# Patient Record
Sex: MALE | Race: WHITE
[De-identification: names, ages, dates, MRNs, and addresses within clinical notes are randomized per-mention and may not be internally consistent; named-entity substitution may affect disease eponyms.]

---

## 2017-10-10 ENCOUNTER — HOSPITAL ENCOUNTER (OUTPATIENT)
Dept: HOSPITAL 80 - FSGY | Age: 72
Discharge: HOME | End: 2017-10-10
Attending: ORTHOPAEDIC SURGERY
Payer: COMMERCIAL

## 2017-10-10 VITALS
SYSTOLIC BLOOD PRESSURE: 127 MMHG | OXYGEN SATURATION: 90 % | RESPIRATION RATE: 15 BRPM | DIASTOLIC BLOOD PRESSURE: 74 MMHG

## 2017-10-10 VITALS — TEMPERATURE: 96.8 F | HEART RATE: 53 BPM

## 2017-10-10 DIAGNOSIS — M19.011: ICD-10-CM

## 2017-10-10 DIAGNOSIS — S46.191A: Primary | ICD-10-CM

## 2017-10-10 DIAGNOSIS — S43.421A: ICD-10-CM

## 2017-10-10 LAB
ANION GAP SERPL CALC-SCNC: 12 MEQ/L (ref 8–16)
CALCIUM SERPL-MCNC: 10 MG/DL (ref 8.5–10.4)
CHLORIDE SERPL-SCNC: 108 MEQ/L (ref 97–110)
CO2 SERPL-SCNC: 17 MEQ/L (ref 22–31)
CREAT SERPL-MCNC: 1.4 MG/DL (ref 0.7–1.3)
GFR SERPL CREATININE-BSD FRML MDRD: 50 ML/MIN/{1.73_M2}
GLUCOSE SERPL-MCNC: 212 MG/DL (ref 70–100)
INR PPP: 1.13 (ref 0.83–1.16)
POTASSIUM SERPL-SCNC: 5 MEQ/L (ref 3.5–5.2)
PROTHROMBIN TIME: 14.4 SEC (ref 12–15)
SODIUM SERPL-SCNC: 137 MEQ/L (ref 134–144)

## 2017-10-10 PROCEDURE — 0LS14ZZ REPOSITION RIGHT SHOULDER TENDON, PERCUTANEOUS ENDOSCOPIC APPROACH: ICD-10-PCS | Performed by: ORTHOPAEDIC SURGERY

## 2017-10-10 PROCEDURE — 0MN14ZZ RELEASE RIGHT SHOULDER BURSA AND LIGAMENT, PERCUTANEOUS ENDOSCOPIC APPROACH: ICD-10-PCS | Performed by: ORTHOPAEDIC SURGERY

## 2017-10-10 PROCEDURE — 0MB14ZZ EXCISION OF RIGHT SHOULDER BURSA AND LIGAMENT, PERCUTANEOUS ENDOSCOPIC APPROACH: ICD-10-PCS | Performed by: ORTHOPAEDIC SURGERY

## 2017-10-10 PROCEDURE — 0PB94ZZ EXCISION OF RIGHT CLAVICLE, PERCUTANEOUS ENDOSCOPIC APPROACH: ICD-10-PCS | Performed by: ORTHOPAEDIC SURGERY

## 2017-10-10 RX ADMIN — Medication PRN MCG: at 10:15

## 2017-10-10 RX ADMIN — Medication PRN MCG: at 10:39

## 2017-10-10 NOTE — PDGENHP
History & Physical


Chief Complaint: shoulder pain


History of Present Illness: 73 yo


Pertinent Past, Social, Family History: none


Relevant Physical Exam: pain

## 2017-10-10 NOTE — PDANEPAE
ANE History of Present Illness





73 yo male with multi co-morbidities for R shoulder arthroscopy.





ANE Past Medical History





- Cardiovascular History


Hx Hypertension: Yes


Hx Arrhythmias: Yes


Hx Chest Pain: No


Hx Coronary Artery / Peripheral Vascular Disease: No


Hx CHF / Valvular Disease: No


Hx Palpitations: No


Cardiovascular History Comment: cardioversion for flutter 12-16  takes 

flecainide.Converted to NSR 12-16.





- Pulmonary History


Hx COPD: No


Hx Asthma/Reactive Airway Disease: No


Hx Recent Upper Respiratory Infection: No


Hx Oxygen in Use at Home: Yes


O2 in Use at Home (L/minute): 5L for sleeping;5L 14/wk-while working


Hx Sleep Apnea: Yes


Sleep Apnea Screening Result - Last Documented: Positive


Pulmonary History Comment: sleep apnea-bipap.  chronic low o2 sats usually mid 

80's





- Neurologic History


Hx Cerebrovascular Accident: No


Hx Seizures: No


Hx Dementia: No





- Endocrine History


Hx Diabetes: Yes


Obesity: moderate


Endocrine History Comment: type 2





- Renal History


Hx Renal Disorders: Yes


Renal History Comment: hx kidney stones- passed.





- Liver History


Hx Hepatic Disorders: Yes


Hepatic History Comment: hx hep 40 yrs ago- ostrolus r/t hygiene in [place]. 

jaundice while living in [place].





- Neurological & Psychiatric Hx


Hx Neurological and Psychiatric Disorders: Yes


Neurological / Psychiatric History Comment: mild depression- comes and goes





- Cancer History


Hx Cancer: No





- Congenital Disorder History


Hx Congenital Disorders: No





- GI History


Hx Gastrointestinal Disorders: No





- Other Health History


Other Health History: gout.  osteoarthritis.  bursitis.  tendonitis-3 tendons 

torn R shoulder.  shin splints.  





- Chronic Pain History


Chronic Pain: Yes (all joints)





- Surgical History


Prior Surgeries: L total hip 2014.  2012- l shoulder arthroscopy subacromial 

decompression, rot cuff repair,labral debride.  l thumb reconstruction. 2004-

tooth ext.





ANE Review of Systems


Review of Systems: 








- Exercise capacity


METS (RN): 4 METS





- Systems


Constitutional: Reports: no symptoms


EENMT: Reports: other (clear rhinorrhea x3 days last week, now resolved, no 

other symptoms)


Cardiac: Reports: no symptoms


Respiratory: Reports: shortness of breath (chronic, stable; worse with activity)





ANE Patient History





- Allergies


Allergies/Adverse Reactions: 








levofloxacin [From Levaquin] Allergy (Verified 10/05/17 16:08)


 Other-Enter Comments








- Home Medications


Home medications: home medication list seen and reviewed


Home Medications: 








Flecainide Acetate [Tambocor] 100 mg PO BIDMEAL 06/19/12 [Last Taken 12/25/16 08

:00]


Metoprolol Tartrate [Lopressor 100 mg (*)] 100 mg PO DAILY@08 06/19/12 [Last 

Taken 12/25/16 08:00]


metFORMIN HCL [Glucophage 500 mg (*)] 500 mg PO BIDMEAL 06/19/12 [Last Taken 12/ 25/16 08:00]


Aspirin EC [Aspirin EC 81 mg (*)] 81 mg PO DAILY 03/10/14 [Last Taken 10/04/17 

09:30]


Gemfibrozil [Lopid 600 MG (*)] 600 mg PO BIDMEAL 03/10/14 [Last Taken 12/25/16 

08:00]


Pravastatin Sodium 20 mg PO HS 03/10/14 [Last Taken 12/24/16]


Insulin Lispro [Humalog Kwikpen U-100] 35 units SC BIDAC 05/04/16 [Last Taken 12 /25/16 08:00]


Omega-3 Fatty Acids [Fish Oil 1000 mg (*)] 1,000 mg PO BIDMEAL 05/04/16 [Last 

Taken 10/05/17 09:30]


Acetaminophen [Tylenol 325mg (*)] 650 mg PO Q6 PRN 09/07/16 [Last Taken 12/21/16

]


Losartan Potassium [Cozaar] 50 mg PO DAILY 09/07/16 [Last Taken 12/25/16]


Allopurinol [Allopurinol 100 MG (*)] 100 mg PO BID 12/25/16 [Last Taken 12/25/ 16 08:00]


Dulaglutide [Trulicity] 1.5 mg SC MO@09 12/25/16 [Last Taken 12/19/16]


Herbals/Supplements -Info Only 1 ea PO DAILY 12/25/16 [Last Taken 10/05/17 09:30

]


Metoprolol Tartrate [Lopressor 100 mg (*)] 200 mg PO DAILY@18 12/25/16 [Last 

Taken 12/24/16]


ERYTHROMYCIN 2% GEL  10/05/17 [Last Taken Unknown]


Lantus 100 UNITS/ML (*)  10/05/17 [Last Taken Unknown]


Warfarin Sodium  10/05/17 [Last Taken 10/04/17 19:00]








- NPO status


NPO Since - Liquids (Date): 10/10/17


NPO Since - Liquids (Time): 02:30


NPO Since - Solids (Date): 10/09/17





- Anes Hx


Anes Hx: slow to awaken from anesthesia





- Smoking Hx


Smoking Status: Never smoked


Marijuana use: No





- Alcohol Use


Alcohol Use: Rarely





- Family Anes Hx


Family Anes Hx: neg - N/A


Family Hx Anesthesia Complications: none





ANE Labs/Vital Signs





- Labs


Result Diagrams: 


 10/10/17 07:00





- Vital Signs


Vital Signs: reviewed preoperatively; see RN documention for details


Height: 190.5 cm


Weight: 131.542 kg





ANE Physical Exam





- Airway


Neck exam: FROM


Mallampati Score: Class 2


Mouth exam: normal dental/mouth exam





- Pulmonary


Pulmonary: other (coarse breath sounds throughout)





- Cardiovascular


Cardiovascular: regular rate and rhythym





- ASA Status


ASA Status: III





ANE Anesthesia Plan


Anesthesia Plan: general endotracheal anesthesia

## 2017-10-10 NOTE — POSTANESTH
Post Anesthetic Evaluation


Cardiovascular Status: Normal, Stable


Respiratory Status: Tx Decrease in SpO2


Level of Consciousness/Mental Status: Can Participate in Eval, Mildly Sleepy, 

Arousable


Pain Control: Adequate, Prn Tx Ordered


Nausea/Vomiting Control: Adequate, Prn Tx Ordered


Complications Possibly Related to Anesthesia: None Noted (Pt on home CPAP 

settings with O2 bleed in to maintain SpO2 >90%)

## 2017-10-26 NOTE — GOP
[f rep st]



                                                                OPERATIVE REPORT





DATE OF OPERATION:  10/10/2017



SURGEON:  Gaby Kenyon MD



ANESTHESIA:  General.



PREOPERATIVE DIAGNOSIS:  Right shoulder impingement syndrome with associated labral pathology, glenoh
umeral arthritic changes, acromioclavicular arthropathy.  Rule out biceps tendinitis.



POSTOPERATIVE DIAGNOSIS:  Right shoulder impingement syndrome with associated labral pathology, gleno
humeral arthritic changes, acromioclavicular arthropathy.  Rule out biceps tendinitis.  With signific
antly fraying of the long head of the biceps involving the intra-articular portion.



PROCEDURE PERFORMED:  

1.  Arthroscopic debridement of partial-thickness rotator cuff tear, labral fraying, and glenohumeral
 chondral changes.

2.  Arthroscopic subacromial decompression.

3.  Biceps tenotomy. 

4.  Ngozi resection of distal clavicle.



FINDINGS:  





ESTIMATED BLOOD LOSS:  5 cc.



INDICATIONS:  This patient is a 72-year-old right-hand dominant male, who has had right shoulder pain
, which has been unresponsive to conservative management.  He has been evaluated with an MRI scan, wh
ich shows evidence of the above-noted preoperative diagnoses.  He was admitted for operative treatmen
t.



DESCRIPTION OF PROCEDURE:  After the induction of a general anesthetic, the patient was placed on the
 operating room table in a modified beach chair position.  His right shoulder, axilla, and arm were p
repped and draped in the usual fashion.  Standard arthroscopic portals were utilized.  The scope was 
introduced through the posterior portal, and the glenohumeral joint inspected.  The intra-articular e
xam was remarkable for a constellation of abnormal findings.  There was a partial-thickness tear invo
lving the supraspinatus at its insertion on the greater tuberosity.  This was minimally debrided.  It
 appeared to involve much less than 50% of the depth of the tendon in a segment that measured approxi
mately a centimeter and a half in length.  It was felt to be a lesion that would likely heal followin
g decompression.  



The glenoid and humeral articular surfaces were in relatively good condition.  There was some focal c
hondral damage on both the humeral and glenoid sides, which was minimally debrided.  There were no ar
eas of full-thickness loss.  



The biceps tendon was inspected.  There was sign fraying, as the long head of the biceps exited the s
houlder.  The damage appeared to be greater than what would likely heal following debridement and dec
ompression.  A biceps tenotomy was therefore performed.  



There was some fraying of the anterior and superior labrum, which was minimally debrided with the rot
sergo debrider.  The glenohumeral ligament/labral complex appeared to be competent.  



The scope was passed to the subacromial space.  There was marked chaffing of the CA ligament at its i
nsertion on the anterior acromion. 



The undersurface of the acromion was resected, and the CA ligament released.  The markedly-prominent 
anterior lip to the acromion was removed with the rotary juliet.  Approximately a centimeter of anterio
r bone was resected.  The resection was tapered posteriorly till it blended nicely with the subacromi
al surface.  This nicely decompressed the subacromial space. 



The AC joint was inspected.  There was diffuse grade 3 with focal grade 4 arthritic change on the dis
rosalinda clavicle.  There was also a large spur emanating from the inferior surface of the distal clavicle
, encroaching on the acromial outlet.  A distal clavicular excision was, therefore, performed.  Appro
ximately the distal centimeter of the clavicle was removed.  



At this point, the debrider was evacuated from the shoulder and subacromial space before the arthrosc
opic equipment was removed.  The portals were infiltrated with 0.25% Marcaine solution before being c
losed with 5-0 Vicryl.  Steri-Strips and sterile compressive dressing were applied, and the patient w
as awakened from his anesthetic without complication.



COMPLICATIONS:  None.





Job #:  [id number]

## 2018-04-19 ENCOUNTER — HOSPITAL ENCOUNTER (INPATIENT)
Dept: HOSPITAL 80 - FED | Age: 73
LOS: 1 days | Discharge: HOME | DRG: 149 | End: 2018-04-20
Attending: FAMILY MEDICINE | Admitting: FAMILY MEDICINE
Payer: COMMERCIAL

## 2018-04-19 DIAGNOSIS — E86.0: ICD-10-CM

## 2018-04-19 DIAGNOSIS — R00.1: ICD-10-CM

## 2018-04-19 DIAGNOSIS — E11.22: ICD-10-CM

## 2018-04-19 DIAGNOSIS — Z99.81: ICD-10-CM

## 2018-04-19 DIAGNOSIS — R42: Primary | ICD-10-CM

## 2018-04-19 DIAGNOSIS — E66.9: ICD-10-CM

## 2018-04-19 DIAGNOSIS — N18.3: ICD-10-CM

## 2018-04-19 DIAGNOSIS — Z79.4: ICD-10-CM

## 2018-04-19 DIAGNOSIS — I12.9: ICD-10-CM

## 2018-04-19 DIAGNOSIS — E11.65: ICD-10-CM

## 2018-04-19 DIAGNOSIS — J96.11: ICD-10-CM

## 2018-04-19 DIAGNOSIS — E87.5: ICD-10-CM

## 2018-04-19 DIAGNOSIS — Z66: ICD-10-CM

## 2018-04-19 LAB
INR PPP: 1.62 (ref 0.83–1.16)
PLATELET # BLD: 188 10^3/UL (ref 150–400)
PROTHROMBIN TIME: 19.4 SEC (ref 12–15)

## 2018-04-19 RX ADMIN — FLECAINIDE ACETATE SCH MG: 100 TABLET ORAL at 23:40

## 2018-04-19 RX ADMIN — INSULIN GLARGINE SCH UNITS: 100 INJECTION, SOLUTION SUBCUTANEOUS at 22:17

## 2018-04-19 RX ADMIN — Medication SCH MG: at 22:17

## 2018-04-19 NOTE — PDGENHP
History and Physical





- Chief Complaint


dizzy, weakness





- History of Present Illness


 72 y/o male with a history of atrial fibrillation and type 2 diabetes 

complaining of intermittent episodes feeling dizzy and lightheaded that last 

for several hours, onset 3 days ago. His physican recommended that he present 

to the ED today as the dizziness has not improved. He is currently not feeling 

dizzy. Over the past few weeks he has been having ELLISON and has noticed leg 

swelling. He uses 4 L O2 while working and while sleeping. He has not had an 

increased to this. He does not use any at rest. He has not had any chest pain. 

He has not felt palpitations. He denies a hx of CHF





He has a hx of Afib which he says he is typically not in. Here he was noted to 

have sinus bradycardia in the mid 40's. He was also noted to have hyperkalemia 

and this was treated in the E.D. 





No fever, chills, chest pain, shortness of breath, palpitations, vomiting, 

diarrhea, urinary complaints, headache.








PAST MEDICAL HISTORY: Atrial fibrillation, type 2 diabetes, low O2 sats (seen 

at [facility]), hepatitis A, multiple ortho surgeries, wears 4L 

supplemental oxygen with exertion.  





SOCIAL HISTORY:  lives in [place], no tobacco





Fmx: NC





Lab/Data:


Reviewed





History Information





- Allergies/Home Medication List


Allergies/Adverse Reactions: 








levofloxacin [From Levaquin] Allergy (Verified 04/19/18 15:32)


 Other-Enter Comments





Home Medications: 








Flecainide Acetate [Tambocor] 100 mg PO BIDMEAL 06/19/12 [Last Taken 04/19/18]


metFORMIN HCL [Glucophage 500 mg (*)] 500 mg PO BIDMEAL 06/19/12 [Last Taken 04/ 19/18]


Aspirin EC [Aspirin EC 81 mg (*)] 81 mg PO DAILY 03/10/14 [Last Taken 04/19/18]


Pravastatin Sodium 20 mg PO HS 03/10/14 [Last Taken 04/18/18]


Omega-3 Fatty Acids [Fish Oil 1000 mg (*)] 1,000 mg PO TID 05/04/16 [Last Taken 

04/19/18]


Losartan Potassium [Cozaar] 50 mg PO DAILY 09/07/16 [Last Taken 04/19/18]


Allopurinol [Allopurinol 100 MG (*)] 100 mg PO BID 12/25/16 [Last Taken 04/19/18

]


Herbals/Supplements -Info Only 1 ea PO DAILY 12/25/16 [Last Taken 10/05/17 09:30

]


Insulin Detemir [Levemir] 40 unit SQ BID 04/19/18 [Last Taken 04/19/18]


Insulin Lispro [Humalog] 25 unit SQ DAILY@12 04/19/18 [Last Taken 04/19/18]


Insulin Lispro [Humalog] 40 unit SQ BID 04/19/18 [Last Taken 04/19/18]


Warfarin Sodium [Coumadin 5MG (*)] 5 mg PO MOTUWETHFRSA@16 04/19/18 [Last Taken 

04/18/18]


Warfarin Sodium [Coumadin 5MG (*)] 5 mg PO TOVAR@16 04/19/18 [Last Taken 04/15/18]





I have personally reviewed and updated: medical history, social history





- Social History


Smoking Status: Never smoked





Review of Systems


Review of Systems: 





ROS: 10pt was reviewed & negative except for what was stated in HPI & below





Physical Exam


Physical Exam: 

















Temp Pulse Resp BP Pulse Ox


 


 36.6 C   48 L  16   187/78 H  90 L


 


 04/19/18 20:01  04/19/18 20:01  04/19/18 20:01  04/19/18 20:01  04/19/18 20:01




















O2 (L/minute)                  2














Constitutional: no apparent distress


Eyes: PERRL, EOMI


Ears, Nose, Mouth, Throat: moist mucous membranes, hearing normal


Cardiovascular: regular rate and rhythym, edema (trace LE edema bilaterally), 

No JVD


Respiratory: other (decreased at bases)


Gastrointestinal: normoactive bowel sounds, soft, non-tender abdomen


Skin: warm


Musculoskeletal: full muscle strength


Neurologic: AAOx3


Psychiatric: interacting appropriately, not anxious, not encephalopathic





Lab Data & Imaging Review





 04/19/18 16:20





 04/19/18 16:20














WBC  6.67 10^3/uL (3.80-9.50)   04/19/18  16:20    


 


RBC  5.16 10^6/uL (4.40-6.38)   04/19/18  16:20    


 


Hgb  16.0 g/dL (13.7-17.5)   04/19/18  16:20    


 


Hct  47.6 % (40.0-51.0)   04/19/18  16:20    


 


MCV  92.2 fL (81.5-99.8)   04/19/18  16:20    


 


MCH  31.0 pg (27.9-34.1)   04/19/18  16:20    


 


MCHC  33.6 g/dL (32.4-36.7)   04/19/18  16:20    


 


RDW  15.3 % (11.5-15.2)  H  04/19/18  16:20    


 


Plt Count  188 10^3/uL (150-400)   04/19/18  16:20    


 


MPV  10.8 fL (8.7-11.7)   04/19/18  16:20    


 


Neut % (Auto)  51.6 % (39.3-74.2)   04/19/18  16:20    


 


Lymph % (Auto)  34.8 % (15.0-45.0)   04/19/18  16:20    


 


Mono % (Auto)  9.3 % (4.5-13.0)   04/19/18  16:20    


 


Eos % (Auto)  3.3 % (0.6-7.6)   04/19/18  16:20    


 


Baso % (Auto)  0.6 % (0.3-1.7)   04/19/18  16:20    


 


Nucleat RBC Rel Count  0.0 % (0.0-0.2)   04/19/18  16:20    


 


Absolute Neuts (auto)  3.44 10^3/uL (1.70-6.50)   04/19/18  16:20    


 


Absolute Lymphs (auto)  2.32 10^3/uL (1.00-3.00)   04/19/18  16:20    


 


Absolute Monos (auto)  0.62 10^3/uL (0.30-0.80)   04/19/18  16:20    


 


Absolute Eos (auto)  0.22 10^3/uL (0.03-0.40)   04/19/18  16:20    


 


Absolute Basos (auto)  0.04 10^3/uL (0.02-0.10)   04/19/18  16:20    


 


Absolute Nucleated RBC  0.00 10^3/uL (0-0.01)   04/19/18  16:20    


 


Immature Gran %  0.4 % (0.0-1.1)   04/19/18  16:20    


 


Immature Gran #  0.03 10^3/uL (0.00-0.10)   04/19/18  16:20    


 


Sodium  139 mEq/L (135-145)   04/19/18  16:20    


 


Potassium  6.1 mEq/L (3.5-5.2)  H  04/19/18  16:20    


 


Chloride  109 mEq/L ()   04/19/18  16:20    


 


Carbon Dioxide  19 mEq/l (22-31)  L  04/19/18  16:20    


 


Anion Gap  11 mEq/L (8-16)   04/19/18  16:20    


 


BUN  42 mg/dL (7-23)  H  04/19/18  16:20    


 


Creatinine  1.5 mg/dL (0.7-1.3)  H  04/19/18  16:20    


 


Estimated GFR  46   04/19/18  16:20    


 


Glucose  223 mg/dL ()  H  04/19/18  16:20    


 


Calcium  8.6 mg/dL (8.5-10.4)   04/19/18  16:20    


 


Troponin I  < 0.012 ng/mL (0.000-0.034)   04/19/18  16:20    


 


Specimen Hemolysis  101   04/19/18  16:20    











Assessment & Plan


Assessment: 








#Acute Hyperkalemia


-No EKG changes


-s/p treatment in the E.D.


-Has not received diuretics





#Reina Syncope, Dizziness, and generalized weakness





#sinus bradycardia with a hx of Afib





#Acute vs chronic renal failure


-Does not appear to be intravascularly dry





#IDDM





#HTN


-takes Losartan at home





#chronic AC with Warfarin


-INR not checked





#chronic resp failure with ELLISON





Plan:


Admit


check TTE, serial trops, telemetry


Unclear if Cards was consulted in the E.D. He sees Dr. Mack in the E.D.


He has pedal edema and decreased lung sounds at bases. Given these findings and 

Hyperkalemia, I will provide small amount of Lasix now. Unfortunately a CXR was 

not obtained, and Ill check one now


Hold Losartan and it could have contributed to the Hyperkalemia. Can treat HTN 

with IV Hydralazine overnight


Cont Flecainide for now. This has reported less than 1 % incidence of causing 

bradycardia. Cards to eval


Home insulin. Holding Metformin


Coumadin per pharmacy. INR was not checked, ill order now


Monitor cr closely. This may be chronic but BUN elevation noted. Will obtain 

urine studies.








DNR, confirmed at bedside

## 2018-04-19 NOTE — EDPHY
HPI/HX/ROS/PE/MDM


Narrative: 


CHIEF COMPLAINT:  Dizzy, lightheaded





HISTORY OF PRESENT ILLNESS:


The patient is an anticoagulated (Coumadin) 72 y/o male with a history of 

atrial fibrillation and type 2 diabetes complaining of intermittent episodes 

feeling dizzy and lightheaded that last for several hours, onset 3 days ago. 

His physician recommended that he present to the ED today as the dizziness has 

not improved. He is currently not feeling dizzy. Denies taking medications for 

CHF.  Patient is on flecainide for arrhythmias





No fever, chills, chest pain, shortness of breath, palpitations, vomiting, 

diarrhea, urinary complaints, headache.





REVIEW OF SYSTEMS:


Aside from elements discussed in the HPI, a comprehensive 10-point review of 

systems was reviewed and is negative.





PAST MEDICAL HISTORY: Atrial fibrillation, type 2 diabetes, low O2 sats (seen 

at [facility]) with O2 dependency,, hepatitis A, multiple ortho surgeries, 

wears 4L supplemental oxygen with exertion.  





SOCIAL HISTORY: Friend at bedside, employed, lives in [place]





VITAL SIGNS: Reviewed by me


GENERAL:  Obese gentleman, resting comfortably in no respiratory distress.


HEENT: Atraumatic. Eyes: No icterus, no injection. Mouth: moist mucous 

membranes.  No erythema or lesions. Neck: supple with no adenopathy.


LUNGS: Clear to auscultation bilaterally, no wheezes, rhonchi or rales.


CARDIAC: Regular bradycardia with a rate of 40, no rubs, murmurs or gallops.


ABDOMEN: Soft, nontender, nondistended, bowel sounds normal.


BACK:  No CVA tenderness.


EXTREMITIES: No trauma. No edema.  Range of motion is normal throughout.


NEURO: Alert and oriented,  grossly nonfocal.  


SKIN: Warm and dry, no rash.


PSYCHIATRIC: Normal mentation, no agitation.





Portions of this note were transcribed by a medical scribe.  I personally 

performed a history, physical exam, medical decision making, and confirmed 

accuracy of information the transcribed note.





ED Course: 


The patient is an anticoagulated (Coumadin) 72 y/o male with a history of 

atrial fibrillation and type 2 diabetes presenting with intermittent episodes 

of feeling dizzy and lightheaded that last for several hours, onset 3 days ago. 

Labs and EKG ordered.





1614: 12-LEAD EKG:  Please see the full report in [facility]. My interpretation

: Sinus bradycardia with a rate of 46, first degree AV block





1706: Patient has a potassium of 6.1 and a creatinine of 1.5.  Given his 

dizziness in the setting of bradycardia as well as worsening renal function 

with a potassium of 6.1, patient received treatment for hyperkalemia including 

calcium, bicarbonate, D50, insulin.





1715: Consulted with hospitalist service, Dr. Concepcion accepts admission of 

this patient.





Reassessed patient and discussed laboratory and EKG findings. I have also 

discussed plan for admission, which he is comfortable with.





MDM: 





Differential diagnosis of the patient's dizziness was considered including but 

not limited to peripheral and central causes of vertigo, cardiac arrhythmias, 

cardiac ischemia, electrolyte disturbances, neurologic causes, orthostatic 

causes including dehydration, and blood loss.





- Data Points


Imaging Results: 


 Imaging Impressions





Carotid Doppler Study  04/19/18 06:00


Impression:  No evidence of flow-limiting carotid stenosis.


 


Measurement of carotid stenosis is based on velocity parameters that correlate 

the residual internal carotid diameter with [place] Symptomatic Carotid 

Endarterectomy Trial (NASCET) based stenosis levels.


 


 


 











Laboratory Results: 


 Laboratory Results





 04/19/18 16:20 





 04/19/18 16:20 





 











  04/19/18 04/19/18





  16:20 16:20


 


WBC    6.67 10^3/uL 10^3/uL





    (3.80-9.50) 


 


RBC    5.16 10^6/uL 10^6/uL





    (4.40-6.38) 


 


Hgb    16.0 g/dL g/dL





    (13.7-17.5) 


 


Hct    47.6 % %





    (40.0-51.0) 


 


MCV    92.2 fL fL





    (81.5-99.8) 


 


MCH    31.0 pg pg





    (27.9-34.1) 


 


MCHC    33.6 g/dL g/dL





    (32.4-36.7) 


 


RDW    15.3 % H %





    (11.5-15.2) 


 


Plt Count    188 10^3/uL 10^3/uL





    (150-400) 


 


MPV    10.8 fL fL





    (8.7-11.7) 


 


Neut % (Auto)    51.6 % %





    (39.3-74.2) 


 


Lymph % (Auto)    34.8 % %





    (15.0-45.0) 


 


Mono % (Auto)    9.3 % %





    (4.5-13.0) 


 


Eos % (Auto)    3.3 % %





    (0.6-7.6) 


 


Baso % (Auto)    0.6 % %





    (0.3-1.7) 


 


Nucleat RBC Rel Count    0.0 % %





    (0.0-0.2) 


 


Absolute Neuts (auto)    3.44 10^3/uL 10^3/uL





    (1.70-6.50) 


 


Absolute Lymphs (auto)    2.32 10^3/uL 10^3/uL





    (1.00-3.00) 


 


Absolute Monos (auto)    0.62 10^3/uL 10^3/uL





    (0.30-0.80) 


 


Absolute Eos (auto)    0.22 10^3/uL 10^3/uL





    (0.03-0.40) 


 


Absolute Basos (auto)    0.04 10^3/uL 10^3/uL





    (0.02-0.10) 


 


Absolute Nucleated RBC    0.00 10^3/uL 10^3/uL





    (0-0.01) 


 


Immature Gran %    0.4 % %





    (0.0-1.1) 


 


Immature Gran #    0.03 10^3/uL 10^3/uL





    (0.00-0.10) 


 


Sodium  139 mEq/L mEq/L  





   (135-145)  


 


Potassium  6.1 mEq/L H mEq/L  





   (3.5-5.2)  


 


Chloride  109 mEq/L mEq/L  





   ()  


 


Carbon Dioxide  19 mEq/l L mEq/l  





   (22-31)  


 


Anion Gap  11 mEq/L mEq/L  





   (8-16)  


 


BUN  42 mg/dL H mg/dL  





   (7-23)  


 


Creatinine  1.5 mg/dL H mg/dL  





   (0.7-1.3)  


 


Estimated GFR  46   





   


 


Glucose  223 mg/dL H mg/dL  





   ()  


 


Calcium  8.6 mg/dL mg/dL  





   (8.5-10.4)  


 


Troponin I  < 0.012 ng/mL ng/mL  





   (0.000-0.034)  


 


Specimen Hemolysis  101   





   











Medications Given: 


 





Insulin Glargine (Lantus Syringe)  40 units SC BID ADONIS


   Stop: 10/16/18 20:59


   Last Admin: 04/19/18 22:17 Dose:  40 units


Omega-3-Acid Ethyl Esters (Fish Oil)  1,000 mg PO TID ADONIS


   Stop: 10/16/18 21:59


   Last Admin: 04/19/18 22:17 Dose:  1,000 mg


Pravastatin Sodium (Pravachol)  20 mg PO HS ADONIS


   Stop: 10/16/18 20:59


   Last Admin: 04/19/18 22:17 Dose:  20 mg





Discontinued Medications





Calcium Gluconate (Calcium Gluconate)  1 gm IVP EDNOW ONE


   Stop: 04/19/18 17:05


   Last Admin: 04/19/18 17:14 Dose:  1 gm


Dextrose (Dextrose 50% Syringe)  25 gm IVP EDNOW ONE


   Stop: 04/19/18 17:05


   Last Admin: 04/19/18 17:15 Dose:  25 gm


Furosemide (Lasix Injection)  20 mg IVP ONCE ONE


   Stop: 04/19/18 21:12


   Last Admin: 04/19/18 22:17 Dose:  20 mg


Sodium Bicarbonate 150 meq/ (Dextrose)  1,150 mls @ 0 mls/hr IV EDNOW ONE


   PRN Reason: As Directed


   Stop: 04/19/18 17:05


   Last Admin: 04/19/18 17:39 Dose:  1,150 mls


Insulin Human Regular (Humulin R)  10 unit IVP EDNOW ONE


   Stop: 04/19/18 17:05


   Last Admin: 04/19/18 17:20 Dose:  10 units


Sodium Polystyrene Sulfonate (Kayexalate)  30 gm PO EDNOW ONE


   Stop: 04/19/18 17:05


   Last Admin: 04/19/18 17:23 Dose:  30 gm








General


Time Seen by Provider: 04/19/18 16:14


Initial Vital Signs: 


 Initial Vital Signs











Temperature (C)  36.7 C   04/19/18 15:34


 


Heart Rate  49 L  04/19/18 15:34


 


Respiratory Rate  18   04/19/18 15:34


 


Blood Pressure  161/81 H  04/19/18 15:34


 


O2 Sat (%)  92   04/19/18 15:34








 











O2 Delivery Mode               Room Air














Allergies/Adverse Reactions: 


 





levofloxacin [From Levaquin] Allergy (Verified 04/19/18 15:32)


 Other-Enter Comments








Home Medications: 














 Medication  Instructions  Recorded


 


Flecainide Acetate [Tambocor] 100 mg PO BIDMEAL 06/19/12


 


metFORMIN HCL [Glucophage 500 mg 500 mg PO BIDMEAL 06/19/12





(*)]  


 


Aspirin EC [Aspirin EC 81 mg (*)] 81 mg PO DAILY 03/10/14


 


Pravastatin Sodium 20 mg PO HS 03/10/14


 


Omega-3 Fatty Acids [Fish Oil 1000 1,000 mg PO TID 05/04/16





mg (*)]  


 


Losartan Potassium [Cozaar] 50 mg PO DAILY 09/07/16


 


Allopurinol [Allopurinol 100  mg PO BID 12/25/16





(*)]  


 


Herbals/Supplements -Info Only 1 ea PO DAILY 12/25/16


 


Insulin Detemir [Levemir] 40 unit SQ BID 04/19/18


 


Insulin Lispro [Humalog] 25 unit SQ DAILY@12 04/19/18


 


Insulin Lispro [Humalog] 40 unit SQ BID 04/19/18


 


Warfarin Sodium [Coumadin 5MG (*)] 5 mg PO MOTUWETHFRSA@16 04/19/18


 


Warfarin Sodium [Coumadin 5MG (*)] 5 mg PO TOVAR@16 04/19/18














Departure





- Departure


Disposition: [facility]


Clinical Impression: 


 Hyperkalemia, Bradycardia, Dizziness, Renal insufficiency





Condition: Fair


Report Scribed for: Brook Oseguera


Report Scribed by: Kelli Bingham


Date of Report: 04/19/18


Time of Report: 16:16

## 2018-04-19 NOTE — CPEKG
Heart Rate: 46

RR Interval: 1304

P-R Interval: 268

QRSD Interval: 132

QT Interval: 524

QTC Interval: 459

P Axis: 34

QRS Axis: 242

T Wave Axis: 51

EKG Severity - ABNORMAL ECG -

EKG Impression: SINUS BRADYCARDIA

EKG Impression: FIRST DEGREE AV BLOCK

EKG Impression: NONSPECIFIC IVCD WITH LAD

EKG Impression: BORDERLINE R WAVE PROGRESSION, ANTERIOR LEADS

Electronically Signed By: Brook Oseguera 19-Apr-2018 21:38:30

## 2018-04-20 VITALS — SYSTOLIC BLOOD PRESSURE: 166 MMHG | DIASTOLIC BLOOD PRESSURE: 83 MMHG

## 2018-04-20 LAB — PLATELET # BLD: 153 10^3/UL (ref 150–400)

## 2018-04-20 RX ADMIN — INSULIN HUMAN SCH UNITS: 100 INJECTION, SOLUTION PARENTERAL at 09:32

## 2018-04-20 RX ADMIN — Medication SCH MG: at 09:32

## 2018-04-20 RX ADMIN — INSULIN GLARGINE SCH UNITS: 100 INJECTION, SOLUTION SUBCUTANEOUS at 09:33

## 2018-04-20 RX ADMIN — FLECAINIDE ACETATE SCH MG: 100 TABLET ORAL at 09:32

## 2018-04-20 RX ADMIN — INSULIN HUMAN SCH UNITS: 100 INJECTION, SOLUTION PARENTERAL at 12:11

## 2018-04-20 NOTE — GCON
[f rep st]



                                                                    CONSULTATION





CARDIOVASCULAR CONSULTATION.





HISTORY:  He is in the hospital because he has been feeling dizzy.  From Tuesday to Thursday when he 
came in the hospital, he continued to be dizzy on and off.  He felt a little bit lightheaded, a littl
e short of breath and has not been feeling very well. 



For that reason, he came into the hospital.  No chest pain.  He has chronic dyspnea on exertion, but 
it is minor according to him.  He does not have edema.  He has a history of atrial fibrillation in th
e past.  He has had atrial flutter in the past.  He has not felt any of those symptoms now.  He denie
s fevers, chills, cough, nausea, vomiting, diarrhea. 



Chest pain, chest tightness, jaw pain, no arm pain.  No hot, swollen joints, no major rashes, no phot
ophobia, stiff neck, sore throat, no dysphagia or hoarseness. 



No trauma to that neck or chest. 



No history of rheumatic disease, claudication, cerebrovascular disease.  No dysuria or frequency. 



He has cardiac risk factors positive for hypertension, diabetes mellitus, hyperuricemia, obesity. 



Cardiac risk factors are negative for dyslipidemia, which is hard to believe.  Family history of amara
ature coronary artery disease, smoking. 



He has no known coronary artery disease. 



He has sleep apnea and uses a mask which he has with him. 



Two years ago, his history was that his heart was going fast.  He was at work.  It all of a sudden ju
mp to 140 to 160.  He came into the emergency room and he had atrial flutter.  He was cardioverted an
d he left. 



He has eaten today. 



He has not ever passed out.  He has had episodes where he thinks he might pass out. 



He does not have headaches or stiff neck.  He has not had trauma to the head, neck, or chest.



PAST MEDICAL HISTORY/FAMILY HISTORY:  He has no family history of premature coronary artery disease. 
 His mother had some coronary disease at young age, but was not even enough to increase his risk for 
coronary disease.



REVIEW OF SYSTEMS:  10-point review of systems negative except as noted in this record and the record
 itself.



MEDICATIONS:  Listed in the chart.  His medications have included insulin, fish oil, Pravachol, calci
um gluconate and the list is available in the record.



SOCIAL HISTORY:  He was born [place] in [place].  



He was born in [facility] [address] [place].  He has been in [place] to
 practice L[employer].  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  He is a [employer].  He wanted to practice and live here.  He lives alone at this 
time.  He some children who are healthy. 



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 
he does not do very much exercise.  He does not do any steady exercise on a regular basis. 



He does have a dog, but he said he is not very successful at walking it. 



He does not smoke.  Does not drink significant amounts of alcohol and is a practicing [employer].  He i
s very committed to that.



PHYSICAL EXAMINATION:  VITAL SIGNS:  Heart rate when he came in was 49, his blood pressure 161/81, re
spiratory rate 18.  HEENT:  Pupils equal and reactive.  Mucous membranes and mouth moist.  NECK:  Sup
ple.  CARDIOVASCULAR:  S1, S2.  Soft systolic murmur left sternal border.  No diastolic murmur.  No S
3, S4.  No rubs.  PULMONARY:  Rhonchi.  No rales, wheezing or dullness.  ABDOMEN:  Soft, nontender, w
ithout masses, quite obese.  CVA:  No tenderness.  EXTREMITIES:  No ulcerations and no tenderness.  N
EUROLOGIC:  Grossly intact.  PSYCH:  No obvious anxiety or depression.



LABS:  

1.  Potassium was 6.1, creatinine 1.5.  EKG shows a heart rate of 46, first-degree AV block, sinus br
adycardia, interventricular conduction delay.

2.  Chest x-ray showed cardiomegaly.  Echocardiographic study was obtained, and that study showed mil
d concentric LV hypertrophy.  Ejection fraction 78%.  Normal LV systolic function.  Diastolic dysfunc
tion.  Normal RV function.



ASSESSMENT AND PLAN:  

1.  Dizziness.

2.  Bradycardia.

3.  Obesity.

4.  Diabetes.

5.  Hypertension.

6.  Hyperuricemia.

7.  History of atrial flutter. 

Patient is on full anticoagulation and is going to continue to do that for his history of atrial arrh
ythmias. 



He is bradycardic and he has been dizzy.  He feels better today.  He is still bradycardic.  He does n
ot feel in any way that he would accept a pacemaker at this point in time.  His father had a pacemake
r which he thinks killed his father and I said to him,even if you had a combination of abnormal thing
s on an EKG such as significant block and we would recommend strongly a pacemaker would you be enrrique otto, he said he will not under any circumstances take one at this time period. 



He wants to lose weight, get in shape and see how he feels overall. 



At this time, he has no indication for pacemaker, but he is bradycardic. 



__________.  The pharmacy is looking over his medications to see if he was on anything that was lower
ing his heart rate when he came into the hospital. 



As far as I can tell looking at the record I do not see anything.  His echocardiogram was unremarkabl
e.  He has no chest pain, chest tightness, or symptoms of acute coronary syndrome.  I think it would 
be good for him down the road to have an outpatient nuclear study.  I would do an outpatient nuclear 
stress test on him with Lexiscan because he does not seem like he can walk very far on the treadmill.
 



However, I have found out now that he is on flecainide and that could be slowing his heart rate.  He 
is on flecainide.  He has an intraventricular conduction delay but he does not have a significantly w
idened QRS to think that the flecainide is making him toxic at this point in time.  His renal functio
n is a little bit diminished and this is something we can watch. 



At this time he wants to continue the medications he is on and have us continue to look and see if th
ere is some reason he was dizzy and if not, then he wants to leave.  We have talked from prevention p
oint of view of significant exercise effort and he said he would like to do that and significant losi
ng weight effort and he would like to do that as well and he will follow up with his own cardiologist
.



CONCLUSION:  

1.  We need him to lose weight and increase his exercise.

2.  He has no interest in any pacemaker right now so we will watch him and see how he does.

3.  He is on flecainide and flecainide has a more toxic affect at higher heart rate and that is not a
 problem for him.

4.  He does not want to stop flecainide at this point in time, so that might make him feel a little b
it better, it might bring his heart rate up a little bit, but he wants to continue doing what he is d
tina for now.

5.  So far, his echocardiographic study shows no significant pathology from a cardiovascular point of
 view.  He can be monitored for another 24 hours and see how he does and then he will follow up with 
his cardiologist.





Job #:  [id number]

## 2018-04-20 NOTE — ASMTCMCOM
CM Note

 

CM Note                       

Notes:

PT has cleared pt to d/c home independent. No identified needs at this time. CM available for 

changes.







Plan: Independent 

 

Date Signed:  04/20/2018 03:19 PM

Electronically Signed By:GARY Lara

## 2018-04-20 NOTE — CPEKG
Heart Rate: 48

RR Interval: 1250

P-R Interval: 264

QRSD Interval: 118

QT Interval: 516

QTC Interval: 462

P Axis: 55

QRS Axis: -35

T Wave Axis: 61

EKG Severity - ABNORMAL ECG -

EKG Impression: SINUS BRADYCARDIA

EKG Impression: FIRST DEGREE AV BLOCK

EKG Impression: NONSPECIFIC INTRAVENTRICULAR CONDUCTION DELAY

EKG Impression: PROBABLE ANTEROSEPTAL INFARCT, AGE INDETERM

EKG Impression: No significant change from April 19, 2018

Electronically Signed By: Reji White 20-Apr-2018 10:46:52

## 2018-04-20 NOTE — ASMTLACE
LACE

 

Length of stay for            Answers:  1 day                                 

current admission                                                             

Acuity / Level of             Answers:  Yes                                   

Care: Did the patient                                                         

have an inpatient                                                             

admission?                                                                    

Comorbidities - select        Answers:  Diabetes (uncontrolled or             

all that apply                          controlled)                           

                                        Other                         Notes:  Hx of atrial fibrillati
on

# of Emergency department     Answers:  1-2                                   

visits in the last 6                                                          

months                                                                        

Score: 7

 

Date Signed:  04/20/2018 03:20 PM

Electronically Signed By:GARY Lara

## 2018-04-20 NOTE — PDMN
Medical Necessity


Medical necessity: GRG systemic condition - Hyperkalemia  E 87.5  2 days:  vs. 

M326 renal failure:   acute hyperkalemia ( K 6.1) , near syncope, sinus 

bradycardia ( 42-48 bpm) , with HX of afib.,  acute vs. chronic renal failure 

Cr.. (1.5) , IDDM, HTN, chronic AC with Warfarin, Chronic resp failure with ELLISON

,  anticipate > 2 midnights ongoing med nec care for monitoring, further eval 

and tx.

## 2018-04-20 NOTE — ECHO
[email or web address]





[address] [address] 

Main: [phone] 



Fax: 



Transthoracic Echocardiogram 

Name:             ANGEL DOTSON                         MR#:

[id number]

Study Date:       2018                            Study Time:

08:01 AM

YOB: 1945                            Age:

73 year(s)

Height:           190.5 cm (75 in.)                     Weight:

136.99 kg (302 lb.)

BSA:              2.61 m2                               Gender:

Male

Examination:      Echo                                  Indication:

Bradycardia. Hyperkalemia

Image Quality:                                          Contrast: 

Requested by:     Pool Concepcion                         BP:

129 mmHg/72 mmHg

Heart Rate:                                             Rhythm:

Sinus bradycardia

Indication:       Bradycardia. Hyperkalemia 



Procedure Staff 

Ultrasound Technician:   Shayne Stoddard RDCS 

Reading Physician:       Sivakumar Sotelo MD 

Requesting Provider: 



Conclusions:          Normal size left ventricle.  

Mild concentric LV hypertrophy.  

Normal global systolic LV function.  

EF is 78 %.  

No regional wall motion abnormality.  

Diastolic dysfunction is present. .  

Normal size right ventricle.  

Normal RV function.  

The left atrium is normal in size.  

The right atrium is normal in size.  

The mitral valve is normal in appearance and function.  

No aortic valve stenosis is present.  

There is mild focal calcification of the non coronary aortic valve

cusp..

The tricuspid valve is normal in appearance and function.  

The pulmonic valve is normal in appearance and function. 



Measurements: 

Chambers                    Valvular Assessment AV/MV

Valvular Assessment TV/PV



Normal                                   Normal

Normal

Name         Value     Range              Name         Value Range

Name          Value Range

Ao Allison (MM): 4.0 cm    (2.2 cm-3.7            AV Vmax:     1.10 m/s (1

m/s-1.7       PV Vmax:      0.68 m/s (0.6 m/s-0.9



cm)                                  m/s)

m/s)

IVSd (2D):   1.7 cm (0.6 cm-1.1               AV maxP mmHg ( -

)          PV PGmax:     2  mmHg ( - )



cm)                LVOT Vmax:   0.87 m/s (0.7 m/s-1.1 

LVDd (2D):   4.8 cm    (4.2 cm-5.9                              m/s)  



cm)                MV E Vmax:   0.51 m/s ( - )  

LVDs (2D):   2.6 cm    (2.1 cm-4              MV A Vmax:   0.58 m/s (

- )



cm)                MV E/A:      0.88 ( - )  

LVPWd (2D):  1.4 cm    (0.6 cm-1 



cm)   



Patient: ANGEL DOTSON                       MRN: [id number]

Study Date: 2018   Page 1 of 2

08:01 AM 









LVEF (2D): 78 (>=54 %)   



Continued Measurements: 

Chambers                      Valvular Assessment AV/MV  



Name                       Value          Name

Value

LADs Lon.8 cm               MV E' Septal:

0.07  m/s

LA Area:                 21.0 cm2         MV E/E' Septal:

7.70

LA Volume:               72 ml            MV E/E' Lateral:

10.90

LA Volume Index:         27.6 ml/m2   



Findings:             Left Ventricle: 

Normal size left ventricle. Mild concentric LV hypertrophy. Normal

global systolic LV function. EF is

78 %. No regional wall motion abnormality. Diastolic dysfunction is

present. .

Right Ventricle: 

Normal size right ventricle. Normal RV function.  

Left Atrium: 

The left atrium is normal in size.  

Right Atrium: 

The right atrium is normal in size.  

Mitral Valve: 

The mitral valve is normal in appearance and function.  

Aortic Valve: 

No aortic valve stenosis is present. There is mild focal calcification

of the non coronary aortic valve

cusp..  

Tricuspid Valve: 

The tricuspid valve is normal in appearance and function.  

Pulmonic Valve: 

The pulmonic valve is normal in appearance and function.  

Aorta: 

The aorta is normal.  

Pericardium: 

No pericardial effusion. 







Electronically signed by Sivakumar Sotelo MD on 2018 at 11:30 AM 

(No Signature Object) 



Patient: ANGEL DOTSON                       MRN: [id number]

Study Date: 2018   Page 2 of 2

08:01 AM 







[id number]

## 2018-04-20 NOTE — PDDCSUM
Discharge Summary


Discharge Summary: 


DISCHARGE SUMMARY





FOLLOW-UP ITEMS: 


Repeat Cr, BUN, lytes, PT/INR 4/23/18





DATE OF ADMISSION: 4/19/18


DATE OF DISCHARGE: 4/20/18





DISCHARGE DIAGNOSES:


1. Acute lightheadedness


2. Acute hyperkalemia


3. CKD Stage III


4. DM2 w/ hyperglycemia


5. Chronic Hypertension


6. Sinus bradycardia





CONSULTATIONS:


Cardiology





PROCEDURES / IMAGING:


Echo with diastolic dysfunction, preserved EF, no significant valvular issues


CUS w/o flow limiting stenosis





CHIEF COMPLAINT:


Dizziness/Lightheadedness





SUBJECTIVE:


Asymptomatic at time of discharge, no recurrent symptoms





PHYSICAL EXAM ON DISCHARGE:


-150, HR 40-50, sat > 90% on room air, heart rhythm regular w/o 

significant MRG, lungs CTA bilat, trace bilat LE edema





LABS: K 4.7, Cr 1.4, Na 139, Hgb 15, WBC 6,000, INR 1.6, Trop neg x 3, FeNa 3.1%





HOSPITAL COURSE BY PROBLEM:


1. Lightheadedness. Acute, unclear etiology, possibly 2/2 mild dehydration w/ 

elevated BUN and recent low PO intake. Resolved s/p IVF. No e/o arrhythmias on 

tele. Patient to consider outpatient 30-day event monitor through Dr. Lea. 

PT recommended outpatient PT, and patient will arrange.


2. Acute hyperkalemia. Likely 2/2 mild dehydration in setting of CKD and 

impaired clearance, w/ concomitant use of ARB. Held ARB, gave NS, then IV lasix

, w/ good effect. As noted above, no arrhythmias on tele. Cont to hold ARB at 

discharge, repeat labs next Monday, recommend his primary provider consider 

alternative agent for BP mgmt.


3. Chronic Kidney Disease Stage III. Reviewed patient's prior lab history and 

his Cr runs 1.3-1.5, occasionally higher during acute illness. He has been on 

an ARB to preserve GFR, but he may not be tolerating this from a hyperkalemia 

standpoint. Recommended patient hold ARB, repeat labs, and get outpatient Renal 

consultation and establishment of care for ongoing CKD mgmt. His FeNa 3.1%, 

indicating intrinsic renal disease.


4. DM2 w/ hyperglycemia. Appears uncontrolled prior to holding metformin, 

recommended holding metformin given CKD, recommend he follow-up closely w/ Dr. Murray to establish insulin. 


5. Sinus bradycardia. Patient was seen by Dr. Sotelo, and he determined that 

the patient is not hypotensive w/ his anu, and is currently asymptomatic 

despite ongoing anu, so it is unlikely that a sinus bradycardiac caused his 

sx. That said, patient will f/u Dr. Lea and consider outpatient rhythm 

monitoring.





DISCHARGE MEDICATIONS:


Please see official discharge medication reconciliation sheet in chart, hold 

losartan/metformin, continue other Rx.





DISCHARGE INSTRUCTIONS:


Please follow-up at [facility], w/ Dr. Murray, and PCP in short-term.

## 2018-04-20 NOTE — ASMTCASEMG
Living Arrangements

 

What is your living           Answers:  Alone                                 

arrangement? Who do you                                                       

live with?                                                                    

Type Of Residence

 

What kind of residence do     Answers:  Apartment                             

you live in?                                                                  

Discharge Plan Comments

 

Coordination Status           

Comments                      

Notes:

Pts case discussed in morning rounds. Pt is a 74 y/o man admitted for hyperkalemia, bradycardia and 


dizziness. PT has been ordered and awaiting recommendations. Needs are TBD at this time. CM to 

follow.



Plan: TBD

 

Date Signed:  04/20/2018 12:24 PM

Electronically Signed By:GARY Lara

## 2018-06-15 ENCOUNTER — HOSPITAL ENCOUNTER (OUTPATIENT)
Dept: HOSPITAL 80 - FIMAGING | Age: 73
End: 2018-06-15
Attending: PSYCHIATRY & NEUROLOGY
Payer: COMMERCIAL

## 2018-06-15 DIAGNOSIS — G31.9: Primary | ICD-10-CM

## 2018-06-15 DIAGNOSIS — E11.9: ICD-10-CM

## 2018-06-15 DIAGNOSIS — I48.0: ICD-10-CM

## 2018-06-15 DIAGNOSIS — R41.3: ICD-10-CM

## 2018-10-11 ENCOUNTER — HOSPITAL ENCOUNTER (INPATIENT)
Dept: HOSPITAL 80 - FED | Age: 73
LOS: 5 days | Discharge: HOME | DRG: 638 | End: 2018-10-16
Attending: HOSPITALIST | Admitting: HOSPITALIST
Payer: COMMERCIAL

## 2018-10-11 DIAGNOSIS — R13.10: ICD-10-CM

## 2018-10-11 DIAGNOSIS — E87.1: ICD-10-CM

## 2018-10-11 DIAGNOSIS — Z79.01: ICD-10-CM

## 2018-10-11 DIAGNOSIS — E87.5: ICD-10-CM

## 2018-10-11 DIAGNOSIS — M10.9: ICD-10-CM

## 2018-10-11 DIAGNOSIS — N18.3: ICD-10-CM

## 2018-10-11 DIAGNOSIS — E11.65: Primary | ICD-10-CM

## 2018-10-11 DIAGNOSIS — G47.33: ICD-10-CM

## 2018-10-11 DIAGNOSIS — J96.11: ICD-10-CM

## 2018-10-11 DIAGNOSIS — E87.2: ICD-10-CM

## 2018-10-11 DIAGNOSIS — N17.9: ICD-10-CM

## 2018-10-11 DIAGNOSIS — E11.22: ICD-10-CM

## 2018-10-11 DIAGNOSIS — I12.9: ICD-10-CM

## 2018-10-11 DIAGNOSIS — Z79.4: ICD-10-CM

## 2018-10-11 DIAGNOSIS — E66.01: ICD-10-CM

## 2018-10-11 DIAGNOSIS — I48.0: ICD-10-CM

## 2018-10-11 LAB
INR PPP: 1.95 (ref 0.83–1.16)
PLATELET # BLD: 189 10^3/UL (ref 150–400)
PROTHROMBIN TIME: 22.3 SEC (ref 12–15)

## 2018-10-11 RX ADMIN — Medication SCH MG: at 23:36

## 2018-10-11 RX ADMIN — HEPARIN SODIUM SCH UNIT: 5000 INJECTION, SOLUTION INTRAVENOUS; SUBCUTANEOUS at 23:35

## 2018-10-11 RX ADMIN — ACETAMINOPHEN PRN MG: 325 TABLET ORAL at 23:36

## 2018-10-11 RX ADMIN — SODIUM CHLORIDE ONE MLS: 900 INJECTION, SOLUTION INTRAVENOUS at 18:34

## 2018-10-11 RX ADMIN — SODIUM CHLORIDE ONE MLS: 900 INJECTION, SOLUTION INTRAVENOUS at 18:35

## 2018-10-11 NOTE — PDGENHP
History and Physical





- Chief Complaint


Acute malaise





- History of Present Illness


Primary care provider:  Dr. Duenas


Primary nephrologist:Dr. Palacio


Primary cardiologist:  Dr. Lea


Primary endocrinologist:  Dr. Richey





HPI:  73-year-old male presents with acutely worsening general malaise which 

1st began approximately 3 weeks ago and has been associated with some shortness 

of breath, tremulousness, polydipsia, and resulted in evaluation as primary 

care provider office today which revealed a creatinine of 3.1 and a glucose of 

around 400. The patient reports that his glucoses averaged around 400 for the 

past 10 months and he has been titrating his insulin 70/30 with his primary 

endocrinologist, most recently up titrating to 60 units twice daily 

approximately 4 weeks ago.  He reports that over this interval he has also had 

a dry cough and he has chronically been experiencing liquid dysphagia for the 

past several months.  He otherwise denies any chest pain or any other clearly 

identifiable precipitating events 3 weeks ago.  He reports he has been adherent 

to his insulin as well as his home medications.  He reports that he has 

recently been initiated on ACE-inhibitor.  





History Information





- Allergies/Home Medication List


Allergies/Adverse Reactions: 








levofloxacin [From Levaquin] Allergy (Verified 10/11/18 19:35)


 Other-Enter Comments





Home Medications: 








Flecainide Acetate [Tambocor] 100 mg PO BIDMEAL 06/19/12 [Last Taken 10/10/18]


Aspirin EC [Aspirin EC 81 mg (*)] 81 mg PO DAILY 03/10/14 [Last Taken 10/10/18]


Pravastatin Sodium 10 mg PO HS 03/10/14 [Last Taken 10/10/18]


Omega-3 Fatty Acids [Fish Oil 1000 mg (*)] 1,000 mg PO TID 05/04/16 [Last Taken 

10/10/18]


Allopurinol [Allopurinol 100 MG (*)] 100 mg PO BID 12/25/16 [Last Taken 10/10/18

]


Herbals/Supplements -Info Only 1 ea PO DAILY 12/25/16 [Last Taken 10/10/18]


Furosemide [Lasix 20 MG (*)] 20 mg PO BID 10/11/18 [Last Taken 10/10/18]


Insulin Aspart Novolog 70/30 [NovoLOG MIX 70/30 VIAL] 60 unit SC BIDAC 10/11/18 

[Last Taken Unknown]


Lisinopril [Zestril 20 mg (*)] 20 mg PO DAILY 10/11/18 [Last Taken 10/10/18]


Warfarin Sodium [Coumadin 5MG (*)] 5 mg PO TUSA@16 10/11/18 [Last Taken 10/09/18

]


Warfarin Sodium [Coumadin 5MG (*)] 7.5 mg PO SUMOWETHFR@16 10/11/18 [Last Taken 

10/10/18]





I have personally reviewed and updated: family history, medical history, social 

history, surgical history





- Past Medical History


diabetes type 2 (Insulin-dependent)


Additional medical history: Chronic kidney disease stage 3 with baseline 

creatinine 1.3-1.5, most recently 1.8 in the outpatient setting.  Chronic 

hypoxic respiratory failure, utilizing 4 L nasal cannula with activity.  Gout.  

Paroxysmal atrial fibrillation





- Surgical History


Additional surgical history: Left hip surgery.  Left shoulder surgery.  Foot 

surgery





- Family History


Additional family history: Coronary artery disease in his parents





- Social History


Smoking Status: Never smoked


Alcohol Use: None


Drug Use: None


Additional social history: Independent in his ADLs





Review of Systems


Review of Systems: 





ROS: 10pt was reviewed & negative except for what was stated in HPI & below


Constitutional: Reports: chills, malaise


Respiratory: Reports: cough, shortness of breath


Genitourinary: Reports: hematuria (5 weeks ago)





Physical Exam


Physical Exam: 

















Temp Pulse Resp BP Pulse Ox


 


 36.4 C   59 L  18   123/55 H  91 L


 


 10/11/18 18:39  10/11/18 18:39  10/11/18 18:39  10/11/18 18:39  10/11/18 18:39











Constitutional: not in pain, chronically ill appearing, obese, No uncomfortable


Eyes: PERRL, anicteric sclera, EOMI


Ears, Nose, Mouth, Throat: hearing normal, other (Tacky mucous membranes)


Cardiovascular: regular rate and rhythym, no murmur, rub, or gallop (Distant 

heart sounds), edema (Trace bilateral lower extremity)


Respiratory: reduced air movement (Right base), inspiratory crackles (Right base

), No expiratory wheeze, No bronchial breath sounds, No respiratory distress


Gastrointestinal: normoactive bowel sounds, distension (Moderate), No tenderness

, No guarding


Genitourinary: no bladder fullness, no bladder tenderness


Neurologic: AAOx3, sensation intact bilaterally, No weakness (Motor strength 5/

5 bilateral lower extremities)


Psychiatric: interacting appropriately, not anxious, not encephalopathic, 

thought process linear


Lymph, Heme, Immunologic: other (Palpable 2 cm bilateral submandibular lymph 

nodes without any tenderness)





Lab Data & Imaging Review





 10/11/18 18:06





 10/11/18 18:06














WBC  6.34 10^3/uL (3.80-9.50)   10/11/18  18:06    


 


RBC  5.20 10^6/uL (4.40-6.38)   10/11/18  18:06    


 


Hgb  15.6 g/dL (13.7-17.5)   10/11/18  18:06    


 


POC Hgb  17.3 gm/dL (13.7-17.5)   10/11/18  18:13    


 


Hct  45.7 % (40.0-51.0)   10/11/18  18:06    


 


POC Hct  51 % (40-51)   10/11/18  18:13    


 


MCV  87.9 fL (81.5-99.8)   10/11/18  18:06    


 


MCH  30.0 pg (27.9-34.1)   10/11/18  18:06    


 


MCHC  34.1 g/dL (32.4-36.7)   10/11/18  18:06    


 


RDW  14.5 % (11.5-15.2)   10/11/18  18:06    


 


Plt Count  189 10^3/uL (150-400)   10/11/18  18:06    


 


MPV  11.2 fL (8.7-11.7)   10/11/18  18:06    


 


Neut % (Auto)  Not Reported   10/11/18  18:06    


 


Lymph % (Auto)  Not Reported   10/11/18  18:06    


 


Mono % (Auto)  Not Reported   10/11/18  18:06    


 


Eos % (Auto)  Not Reported   10/11/18  18:06    


 


Baso % (Auto)  Not Reported   10/11/18  18:06    


 


Nucleat RBC Rel Count  Not Reported   10/11/18  18:06    


 


Absolute Neuts (auto)  Not Reported   10/11/18  18:06    


 


Absolute Lymphs (auto)  Not Reported   10/11/18  18:06    


 


Absolute Monos (auto)  Not Reported   10/11/18  18:06    


 


Absolute Eos (auto)  Not Reported   10/11/18  18:06    


 


Absolute Basos (auto)  Not Reported   10/11/18  18:06    


 


Absolute Nucleated RBC  Not Reported   10/11/18  18:06    


 


Immature Gran %  Not Reported   10/11/18  18:06    


 


Immature Gran #  Not Reported   10/11/18  18:06    


 


Puncture Site  Not Reported   10/11/18  18:48    


 


Patient Temperature  37.0 DEGREES  10/11/18  18:48    


 


VBG pH  7.30  (7.31-7.42)  L  10/11/18  18:48    


 


VBG HCO3  17 mEQ/L (22-26)  L  10/11/18  18:48    


 


VBG Total CO2  18 mEq/L (21-27)  L  10/11/18  18:48    


 


VBG O2 Saturation  86 % (65-75)  H  10/11/18  18:48    


 


VBG Base Excess  -8.3 mEq/L (-2.5-2.5)  L  10/11/18  18:48    


 


Mixed VBG pCO2  35 mmHg (40-44)  L  10/11/18  18:48    


 


Mixed VBG pO2  55 mmHG (35-40)  H  10/11/18  18:48    


 


POC Sodium  130 mEq/L (135-145)  L  10/11/18  18:13    


 


Sodium  129 mEq/L (135-145)  L  10/11/18  18:06    


 


POC Potassium  4.9 mEq/L (3.3-5.0)   10/11/18  18:13    


 


Potassium  5.1 mEq/L (3.3-5.0)  H  10/11/18  18:06    


 


POC Chloride  100 mEq/L ()   10/11/18  18:13    


 


Chloride  97 mEq/L ()   10/11/18  18:06    


 


Carbon Dioxide  17 mEq/l (22-31)  L  10/11/18  18:06    


 


Anion Gap  15 mEq/L (6-14)  H  10/11/18  18:06    


 


POC BUN  119 mg/dL (7-23)  H*  10/11/18  18:13    


 


BUN  113 mg/dL (7-23)  H*  10/11/18  18:06    


 


Creatinine  2.9 mg/dL (0.7-1.3)  H  10/11/18  18:06    


 


POC Creatinine  2.9 mg/dL (0.7-1.3)  H  10/11/18  18:13    


 


Estimated GFR  22   10/11/18  18:06    


 


Glucose  461 mg/dL ()  H  10/11/18  18:06    


 


POC Glucose  455 mg/dL ()  H  10/11/18  18:13    


 


Calcium  8.8 mg/dL (8.5-10.4)   10/11/18  18:06    


 


Phosphorus  5.2 mg/dL (2.5-4.5)  H  10/11/18  18:06    


 


Magnesium  2.2 mg/dL (1.6-2.3)   10/11/18  18:06    


 


POC Troponin I  0.01 ng/mL (0.00-0.08)   10/11/18  18:49    


 


Lipase  396 IU/L ()  H  10/11/18  18:06    


 


Beta-Hydroxybutyrate  0.11 mmol/L (0.02-0.27)   10/11/18  18:06    








Chest X-Ray results: no infiltrate


Visualized and Interpreted EKG results: Yes


EKG Interpretation: Positive for: other (Normal sinus rhythm with Q-wave in 

lead 3, poor R-wave progression in lead V2 and V3)





Assessment & Plan


Assessment: 








73-year-old male presents with diabetic ketoacidosis complicated by acute 

kidney injury on chronic kidney disease stage 3








Plan: 





1.  DKA.  Acute, evidenced by glucose level of 460, venous pH 7.3, anion gap of 

15, serum bicarbonate 17, unclear whether this has resulted from chronically 

uncontrolled hyperglycemia and subsequent acidosis with hypovolemia verses 

secondary to infectious precipitant


-does the most likely cause of his general discomfort and malaise


-discussed with Dr. Reji Read, we agree the patient should be placed on 

insulin drip and cared for in the ICU


-DKA protocol ordered, continue normal saline at 200 an hour until fluid 

adjustments required


-monitor labs closely


-will most likely require up titration of his insulin 70/30 long-acting insulin 

is re-initiated





2. Possible urinary tract infection.  Urinalysis from primary care provider 

office demonstrates possible UTI, generalized symptoms could be secondary to 

underlying infection


-given ceftriaxone emergency department given his DKA, continue


-urine culture sent





3. Acute metabolic acidosis.  Secondary to DKA, continue monitor serum 

bicarbonate level





4. Acute kidney injury on chronic kidney disease stage 3. Most likely secondary 

to hypovolemia in the setting of DKA as well as ACE-inhibitor affect


-continue normal saline, monitor creatinine level


-monitor urine output


-hold ACE-inhibitor, hold diuretic





5. Atrial fibrillation.  Paroxysmal, monitor on telemetry





6. Chronic hypoxic respiratory failure.  Continue home supplemental oxygen





7. Dysphagia.  Acute on chronic, patient had a witnessed aspiration episode in 

the emergency department when he drinks some water


-get SLP eval and VFSS in a.m.


-repeat chest x-ray to ensure no aspiration pneumonia given physical exam 

findings





8. Morbid obesity.  BMI 36, increased risk of worsening morbidity and/or 

mortality





Diet.  NPO with IV fluids





Prophylaxis.  High risk patient, heparin subcu





Code.  Full





Disposition.  Anticipated discharge uncertain this time, anticipated length 

stay is greater than 48 hr for reasonable medical necessity including DKA, 

acute kidney injury on chronic kidney disease, requiring ICU level care.





Patient is critically ill with high risk for worsening morbidity and/or 

mortality secondary to the issues outlined above, I spent 35 min of critical 

care time at bedside with patient, coordinating of the above care, as well as 

specifically treating his DKA.

## 2018-10-11 NOTE — EDPHY
H & P


Time Seen by Provider: 10/11/18 17:56


HPI/ROS: 





Chief complaint.  Feeling rotten





HPI.  73-year-old male presents emergency department with 3 weeks of feeling 

low energy.  Slight shortness of breath.  No chest pain.  No abdominal pain 

occasional diarrhea.  No fever.  He has been shaky and thirsty.  He tells me 

said poorly controlled diabetes for 10 months and sees [facility] endocrinologist.

  Labs in the office today showed a creatinine of 3.1 and it was 1.8 on 10/05.  

The BUN was 113 and was 6 on 10/05.  Sodium 127, potassium was 5.8.  Glucose 

was 532





ROS


10 systems were reviewed and negative with the exception of the elements 

mentioned in the history of present illness





Past Medical/Surgical History: 





Past medical history diabetes, atrial fibrillation, gout, hypertension, hip 

surgery, BiPAP 4 L


Social History: 





, nonsmoker, no alcohol


Smoking Status: Never smoked


Physical Exam: 





General Appearance:  Alert well-developed male moderate distress vital signs 

show heart rate 55, blood pressure 130/63


Eyes: Pupils equal and round no pallor or injection.


ENT, mucous membranes are dry


Respiratory:  There are no retractions, lungs are clear to auscultation.


Cardiovascular: Regular rate and rhythm.


Gastrointestinal:   Abdomen is soft and nontender, no masses, bowel sounds 

normal.


Neurological:  Awake and alert, sensory and motor exams grossly normal.


Skin: Warm and dry, no rashes.


Musculoskeletal:  Neck is supple nontender.


Extremities  symmetrical, full range of motion.


Psychiatric: Patient is oriented X 3, there is no agitation.


Constitutional: 


 Initial Vital Signs











Heart Rate  55 L  10/11/18 17:43


 


Respiratory Rate  16   10/11/18 17:43


 


Blood Pressure  130/63 H  10/11/18 17:43


 


O2 Sat (%)  93   10/11/18 17:43








 











O2 Delivery Mode               Room Air














Allergies/Adverse Reactions: 


 





levofloxacin [From Levaquin] Allergy (Verified 10/11/18 17:43)


 Other-Enter Comments








Home Medications: 














 Medication  Instructions  Recorded


 


Flecainide Acetate [Tambocor] 100 mg PO BIDMEAL 06/19/12


 


Aspirin EC [Aspirin EC 81 mg (*)] 81 mg PO DAILY 03/10/14


 


Pravastatin Sodium 20 mg PO HS 03/10/14


 


Omega-3 Fatty Acids [Fish Oil 1000 1,000 mg PO TID 05/04/16





mg (*)]  


 


Allopurinol [Allopurinol 100  mg PO BID 12/25/16





(*)]  


 


Herbals/Supplements -Info Only 1 ea PO DAILY 12/25/16


 


Insulin Detemir [Levemir] 40 unit SQ BID 04/19/18


 


Insulin Lispro [Humalog] 25 unit SQ DAILY@12 04/19/18


 


Insulin Lispro [Humalog] 40 unit SQ BID 04/19/18


 


Warfarin Sodium [Coumadin 5MG (*)] 5 mg PO TOVAR@16 04/19/18


 


Warfarin Sodium [Coumadin 5MG (*)] 7.5 mg PO MOTUWETHFRSA@16 04/19/18














Medical Decision Making





- Diagnostics


EKG Interpretation: 





EKG interpreted by me shows normal sinus rhythm normal interval.  Left axis 

deviation.  Mild interventricular conduction delay.  No significant ST 

elevation or depression.  No arrhythmia.  The rate is 60


Imaging Results: 


 Imaging Impressions





Chest X-Ray  10/11/18 18:19


Impression: Moderate cardiac enlargement, without acute cardiopulmonary 

abnormality.











Procedures: 





IV normal saline, monitor.  Point of care testing, insulin drip


ED Course/Re-evaluation: 





At about 7:05 p.m. Patient had a choking episode after drinking water.  He 

dropped his oxygen saturation to about 75%.  Between sitting up, suction, 

supplemental oxygen the choking episode resolved in 2-3 minutes.  He is now 

speaking normally.  He tells me that he has been having increased choking with 

water in food over the past year





I consulted discussed the case with Dr. Parikh who agrees with insulin drip.  

He agrees to the admission





Patient and I discussed imaging EKG and lab results.  We discussed treatment 

plan including recommendation for admission.  He expresses understanding and 

agreement


Differential Diagnosis: 





Patient is in DKA by laboratory evaluation including elevated blood sugar, 

acidosis, increased anion gap.  He also has a UTI.  He has acute renal failure.





- Data Points


Laboratory Results: 


 Laboratory Results





 10/11/18 18:06 





 10/11/18 18:06 





 











  10/11/18 10/11/18 10/11/18





  18:49 18:48 18:13


 


WBC      





    


 


RBC      





    


 


Hgb      





    


 


POC Hgb      17.3 gm/dL gm/dL





     (13.7-17.5) 


 


Hct      





    


 


POC Hct      51 % %





     (40-51) 


 


MCV      





    


 


MCH      





    


 


MCHC      





    


 


RDW      





    


 


Plt Count      





    


 


MPV      





    


 


Neut % (Auto)      





    


 


Lymph % (Auto)      





    


 


Mono % (Auto)      





    


 


Eos % (Auto)      





    


 


Baso % (Auto)      





    


 


Nucleat RBC Rel Count      





    


 


Absolute Neuts (auto)      





    


 


Absolute Lymphs (auto)      





    


 


Absolute Monos (auto)      





    


 


Absolute Eos (auto)      





    


 


Absolute Basos (auto)      





    


 


Absolute Nucleated RBC      





    


 


Immature Gran %      





    


 


Immature Gran #      





    


 


Puncture Site    Not Reported   





    


 


Patient Temperature    37.0 DEGREES DEGREES  





    


 


VBG pH    7.30  L   





    (7.31-7.42)  


 


VBG HCO3    17 mEQ/L L mEQ/L  





    (22-26)  


 


VBG Total CO2    18 mEq/L L mEq/L  





    (21-27)  


 


VBG O2 Saturation    86 % H %  





    (65-75)  


 


VBG Base Excess    -8.3 mEq/L L mEq/L  





    (-2.5-2.5)  


 


Mixed VBG pCO2    35 mmHg L mmHg  





    (40-44)  


 


Mixed VBG pO2    55 mmHG H mmHG  





    (35-40)  


 


POC Sodium      130 mEq/L L mEq/L





     (135-145) 


 


Sodium      





    


 


POC Potassium      4.9 mEq/L mEq/L





     (3.3-5.0) 


 


Potassium      





    


 


POC Chloride      100 mEq/L mEq/L





     () 


 


Chloride      





    


 


Carbon Dioxide      





    


 


Anion Gap      





    


 


POC BUN      119 mg/dL H* mg/dL





     (7-23) 


 


BUN      





    


 


Creatinine      





    


 


POC Creatinine      2.9 mg/dL H mg/dL





     (0.7-1.3) 


 


Estimated GFR      





    


 


Glucose      





    


 


POC Glucose      455 mg/dL H mg/dL





     () 


 


Calcium      





    


 


Phosphorus      





    


 


Magnesium      





    


 


POC Troponin I  0.01 ng/mL ng/mL    





   (0.00-0.08)   


 


Lipase      





    


 


Beta-Hydroxybutyrate      





    














  10/11/18 10/11/18





  18:06 18:06


 


WBC    6.34 10^3/uL 10^3/uL





    (3.80-9.50) 


 


RBC    5.20 10^6/uL 10^6/uL





    (4.40-6.38) 


 


Hgb    15.6 g/dL g/dL





    (13.7-17.5) 


 


POC Hgb    





   


 


Hct    45.7 % %





    (40.0-51.0) 


 


POC Hct    





   


 


MCV    87.9 fL fL





    (81.5-99.8) 


 


MCH    30.0 pg pg





    (27.9-34.1) 


 


MCHC    34.1 g/dL g/dL





    (32.4-36.7) 


 


RDW    14.5 % %





    (11.5-15.2) 


 


Plt Count    189 10^3/uL 10^3/uL





    (150-400) 


 


MPV    11.2 fL fL





    (8.7-11.7) 


 


Neut % (Auto)    Not Reported 





   


 


Lymph % (Auto)    Not Reported 





   


 


Mono % (Auto)    Not Reported 





   


 


Eos % (Auto)    Not Reported 





   


 


Baso % (Auto)    Not Reported 





   


 


Nucleat RBC Rel Count    Not Reported 





   


 


Absolute Neuts (auto)    Not Reported 





   


 


Absolute Lymphs (auto)    Not Reported 





   


 


Absolute Monos (auto)    Not Reported 





   


 


Absolute Eos (auto)    Not Reported 





   


 


Absolute Basos (auto)    Not Reported 





   


 


Absolute Nucleated RBC    Not Reported 





   


 


Immature Gran %    Not Reported 





   


 


Immature Gran #    Not Reported 





   


 


Puncture Site    





   


 


Patient Temperature    





   


 


VBG pH    





   


 


VBG HCO3    





   


 


VBG Total CO2    





   


 


VBG O2 Saturation    





   


 


VBG Base Excess    





   


 


Mixed VBG pCO2    





   


 


Mixed VBG pO2    





   


 


POC Sodium    





   


 


Sodium  129 mEq/L L mEq/L  





   (135-145)  


 


POC Potassium    





   


 


Potassium  5.1 mEq/L H mEq/L  





   (3.3-5.0)  


 


POC Chloride    





   


 


Chloride  97 mEq/L mEq/L  





   ()  


 


Carbon Dioxide  17 mEq/l L mEq/l  





   (22-31)  


 


Anion Gap  15 mEq/L H mEq/L  





   (6-14)  


 


POC BUN    





   


 


BUN  Pending   





   


 


Creatinine  2.9 mg/dL H mg/dL  





   (0.7-1.3)  


 


POC Creatinine    





   


 


Estimated GFR  22   





   


 


Glucose  461 mg/dL H mg/dL  





   ()  


 


POC Glucose    





   


 


Calcium  8.8 mg/dL mg/dL  





   (8.5-10.4)  


 


Phosphorus  5.2 mg/dL H mg/dL  





   (2.5-4.5)  


 


Magnesium  2.2 mg/dL mg/dL  





   (1.6-2.3)  


 


POC Troponin I    





   


 


Lipase  396 IU/L H IU/L  





   ()  


 


Beta-Hydroxybutyrate  0.11 mmol/L mmol/L  





   (0.02-0.27)  











Medications Given: 


 





Sodium Chloride (Ns)  1,000 mls @ 1,000 mls/hr IV EDNOW ONE


   PRN Reason: Protocol


   Stop: 10/11/18 19:18


   Last Admin: 10/11/18 18:36 Dose:  Not Given





Discontinued Medications





Sodium Chloride (Ns)  1,000 mls @ 0 mls/hr IV EDNOW ONE; Wide Open


   PRN Reason: Protocol


   Stop: 10/11/18 18:20


   Last Admin: 10/11/18 18:35 Dose:  1,000 mls





Point of Care Test Results: 


 Chemistry











  10/11/18 10/11/18





  18:49 18:13


 


POC Sodium    130 mEq/L L mEq/L





    (135-145) 


 


POC Potassium    4.9 mEq/L mEq/L





    (3.3-5.0) 


 


POC Chloride    100 mEq/L mEq/L





    () 


 


POC BUN    119 mg/dL H* mg/dL





    (7-23) 


 


POC Creatinine    2.9 mg/dL H mg/dL





    (0.7-1.3) 


 


POC Glucose    455 mg/dL H mg/dL





    () 


 


POC Troponin I  0.01 ng/mL ng/mL  





   (0.00-0.08)  








 ISTAT H&H











  10/11/18





  18:13


 


POC Hgb  17.3 gm/dL gm/dL





   (13.7-17.5) 


 


POC Hct  51 % %





   (40-51) 














Departure





- Departure


Disposition: [facility] Inpatient Acute


Clinical Impression: 


Urinary tract infection


Qualifiers:


 Urinary tract infection type: site unspecified Hematuria presence: without 

hematuria Qualified Code(s): N39.0 - Urinary tract infection, site not specified





DKA (diabetic ketoacidoses)


Qualifiers:


 Diabetes mellitus type: other specified (including WICHO) Diabetes mellitus 

complication detail: without coma Qualified Code(s): E13.10 - Other specified 

diabetes mellitus with ketoacidosis without coma





Renal failure (ARF), acute on chronic


Qualifiers:


 Acute renal failure type: unspecified Chronic kidney disease stage: 

unspecified stage Qualified Code(s): N17.9 - Acute kidney failure, unspecified; 

N18.9 - Chronic kidney disease, unspecified; N18.9 - Chronic kidney disease, 

unspecified





Condition: Fair


Referrals: 


Mya Duenas MD [Primary Care Provider] - As per Instructions

## 2018-10-11 NOTE — CPEKG
Test Reason : OPEN

Blood Pressure : ***/*** mmHG

Vent. Rate : 060 BPM     Atrial Rate : 059 BPM

   P-R Int : 243 ms          QRS Dur : 118 ms

    QT Int : 462 ms       P-R-T Axes : 040 -27 039 degrees

   QTc Int : 462 ms

 

Sinus rhythm

Prolonged MT interval

Nonspecific intraventricular conduction delay

 

Confirmed by Reji Read ([id number]) on 10/11/2018 7:24:06 PM

 

Referred By:             Confirmed By:Reji Read

## 2018-10-12 LAB
INR PPP: 1.96 (ref 0.83–1.16)
PLATELET # BLD: 168 10^3/UL (ref 150–400)
PROTHROMBIN TIME: 22.4 SEC (ref 12–15)

## 2018-10-12 RX ADMIN — HEPARIN SODIUM SCH UNIT: 5000 INJECTION, SOLUTION INTRAVENOUS; SUBCUTANEOUS at 21:32

## 2018-10-12 RX ADMIN — PRAVASTATIN SODIUM SCH: 10 TABLET ORAL at 02:16

## 2018-10-12 RX ADMIN — ALLOPURINOL SCH MG: 100 TABLET ORAL at 11:49

## 2018-10-12 RX ADMIN — WARFARIN SODIUM SCH MG: 7.5 TABLET ORAL at 16:41

## 2018-10-12 RX ADMIN — INSULIN LISPRO SCH UNITS: 100 INJECTION, SOLUTION INTRAVENOUS; SUBCUTANEOUS at 18:09

## 2018-10-12 RX ADMIN — INSULIN LISPRO SCH UNITS: 100 INJECTION, SOLUTION INTRAVENOUS; SUBCUTANEOUS at 11:50

## 2018-10-12 RX ADMIN — ACETAMINOPHEN PRN MG: 325 TABLET ORAL at 03:49

## 2018-10-12 RX ADMIN — BENZOCAINE AND MENTHOL PRN EA: 15; 3.6 LOZENGE ORAL at 16:41

## 2018-10-12 RX ADMIN — Medication SCH MG: at 15:54

## 2018-10-12 RX ADMIN — INSULIN HUMAN SCH UNIT: 100 INJECTION, SUSPENSION SUBCUTANEOUS at 18:27

## 2018-10-12 RX ADMIN — BENZOCAINE AND MENTHOL PRN EA: 15; 3.6 LOZENGE ORAL at 21:32

## 2018-10-12 RX ADMIN — INSULIN HUMAN SCH UNIT: 100 INJECTION, SUSPENSION SUBCUTANEOUS at 05:47

## 2018-10-12 RX ADMIN — Medication SCH MG: at 08:00

## 2018-10-12 RX ADMIN — HEPARIN SODIUM SCH UNIT: 5000 INJECTION, SOLUTION INTRAVENOUS; SUBCUTANEOUS at 15:54

## 2018-10-12 RX ADMIN — FLECAINIDE ACETATE SCH MG: 100 TABLET ORAL at 18:09

## 2018-10-12 RX ADMIN — BENZOCAINE AND MENTHOL PRN EA: 15; 3.6 LOZENGE ORAL at 03:03

## 2018-10-12 RX ADMIN — BENZOCAINE AND MENTHOL PRN EA: 15; 3.6 LOZENGE ORAL at 04:56

## 2018-10-12 RX ADMIN — HEPARIN SODIUM SCH UNIT: 5000 INJECTION, SOLUTION INTRAVENOUS; SUBCUTANEOUS at 05:48

## 2018-10-12 RX ADMIN — Medication SCH MG: at 21:32

## 2018-10-12 RX ADMIN — FLECAINIDE ACETATE SCH MG: 100 TABLET ORAL at 08:00

## 2018-10-12 RX ADMIN — PRAVASTATIN SODIUM SCH MG: 10 TABLET ORAL at 22:16

## 2018-10-12 RX ADMIN — ASPIRIN SCH MG: 81 TABLET, DELAYED RELEASE ORAL at 08:00

## 2018-10-12 NOTE — HOSPPROG
Hospitalist Progress Note


Assessment/Plan: 





73-year-old M with difficult to control diabetes, CKD presents with mild DKA 

and MELIA likely in setting of urinary infection. 





#DKA: Mild, resolved. Off insulin gtt, transitioned back to home subq 70/30. 

Added high dose SSI with regular BG checks. He follows with endocrinology.





#Recurrent UTI: Likely precipitated above. Minimal urinary sxs. Had Klebsiella 

UTI 6/2018 (unknown antibiotics), Klebsiella UTI 7/2018 tx'd with keflex, then 

ESBL Klebsiella UTI 8/2018 treated with cipro. Currently afebrile, not septic. 

Continue CTX, will discuss antibiotic selection with ID given recurrence. 





#MELIA: Improving with IVF. Cr 2.9->2.1, baseline 1.2-1.4. Renally dose meds.





#Atrial fibrillation: Currently NSR. Continue home flecainide, warfarin and 

monitor INR.





#Dysphagia: Eval'd by SLP, cleared for normal diet. Referral for outpatient 

video swallow study. Consider adding H2RA.





#Chronic respiratory insufficiency: On home O2. 





#Obesity: BMI 36





Diet: carb controlled


VTE ppx: SQH


Code: full


Dispo: Remain inpatient, transfer to med/surg.


Subjective: Feeling much better. Not as lethargic. No urinary sxs. No fevers. 

He is curious about continuing some of his home meds.


Objective: 


 Vital Signs











Temp Pulse Resp BP Pulse Ox


 


 36.6 C   61   18   124/57 H  97 


 


 10/12/18 07:21  10/12/18 10:00  10/12/18 10:00  10/12/18 10:00  10/12/18 10:00








 Laboratory Results





 10/12/18 04:50 





 10/12/18 04:50 





 











 10/11/18 10/12/18 10/13/18





 05:59 05:59 05:59


 


Intake Total  3139 


 


Output Total  1500 400


 


Balance  1639 -400








 











PT  22.4 SEC (12.0-15.0)  H  10/12/18  04:50    


 


INR  1.96  (0.83-1.16)  H  10/12/18  04:50    














- Physical Exam


Constitutional: no apparent distress, appears nourished, not in pain, obese


Eyes: PERRL, anicteric sclera, EOMI


Ears, Nose, Mouth, Throat: moist mucous membranes, hearing normal, ears appear 

normal, no oral mucosal ulcers


Cardiovascular: regular rate and rhythym, no murmur, rub, or gallop


Respiratory: no respiratory distress, no rales or rhonchi, clear to auscultation


Gastrointestinal: normoactive bowel sounds, soft, non-tender abdomen, no 

palpable masses


Genitourinary: No werner in urethra


Skin: no rashes or abrasions, no fluctuance, no induration


Musculoskeletal: full muscle strength, no muscle tenderness, normal joint ROM


Neurologic: AAOx3, sensation intact bilaterally


Psychiatric: interacting appropriately, not anxious, not encephalopathic, 

thought process linear





ICD10 Worksheet


Patient Problems: 


 Problems











Problem Status Onset


 


DKA (diabetic ketoacidoses) Acute  


 


Renal failure (ARF), acute on chronic Acute  


 


Urinary tract infection Acute  


 


Atrial fibrillation Acute  


 


Bradycardia Acute  


 


C. difficile diarrhea Acute  05/22/15


 


Chest pain Acute  


 


Chest pain at rest Acute  


 


Dizziness Acute  


 


Hyperkalemia Acute  


 


Laceration Acute  


 


Primary osteoarthritis of left hip Acute  


 


Renal insufficiency Acute

## 2018-10-12 NOTE — PDMN
Medical Necessity


Medical necessity: [facility] [place] [facility] Diabetes  A-2 days:  DKA generalized malaise X 3 

weeks, SOB, tremulousness, polydipsia, cough, liquid dysphagia for the past 

several months,  Cr. 3.1, glucose 460, Venus pH 7.3, anion gap 15., serum 

bicarb 17,acute metabolic acidosis,  PMHx chronic kidney disease stage 3 

baseline Cr 1.3-1.5, chronic hypoxic resp failure 4 L with activity, Gout, 

Paroxysmal a fib.,  morbid obesity- - pt also with poss UTI,   anticipate > 2 

MN ongoing med nec. care further eval , monitoring and tx.

## 2018-10-12 NOTE — ASMTCASEMG
Living Arrangements

 

What is your living           Answers:  Alone                                 

arrangement? Who do you                                                       

live with?                                                                    

Type Of Residence

 

What kind of residence do     Answers:  Apartment                             

you live in?                                                                  

Discharge Plan Comments

 

Coordination Status           

Comments                      

Notes:

Patient is a 74yo  male who presents with diabetic ketoacidosis complicated by acute kidney 


injury on chronic kidney disease stage 3. OT/PT/SLP ordered. Patient would like assistance with 

advanced directives. He has a daughter Madeline ([phone]) and a sister, Frances Rodriguez. D/C 

plan TBD. CM will follow.

 

Date Signed:  10/12/2018 09:47 AM

Electronically Signed By:Edita Lau LCSW

## 2018-10-13 LAB — PLATELET # BLD: 172 10^3/UL (ref 150–400)

## 2018-10-13 RX ADMIN — Medication SCH MG: at 09:22

## 2018-10-13 RX ADMIN — BENZOCAINE AND MENTHOL PRN EA: 15; 3.6 LOZENGE ORAL at 22:58

## 2018-10-13 RX ADMIN — INSULIN LISPRO SCH UNITS: 100 INJECTION, SOLUTION INTRAVENOUS; SUBCUTANEOUS at 13:11

## 2018-10-13 RX ADMIN — INSULIN HUMAN SCH UNIT: 100 INJECTION, SUSPENSION SUBCUTANEOUS at 17:42

## 2018-10-13 RX ADMIN — Medication SCH MG: at 15:42

## 2018-10-13 RX ADMIN — FLECAINIDE ACETATE SCH MG: 100 TABLET ORAL at 18:04

## 2018-10-13 RX ADMIN — HEPARIN SODIUM SCH UNIT: 5000 INJECTION, SOLUTION INTRAVENOUS; SUBCUTANEOUS at 05:54

## 2018-10-13 RX ADMIN — INSULIN LISPRO SCH UNITS: 100 INJECTION, SOLUTION INTRAVENOUS; SUBCUTANEOUS at 09:29

## 2018-10-13 RX ADMIN — BENZOCAINE AND MENTHOL PRN EA: 15; 3.6 LOZENGE ORAL at 17:42

## 2018-10-13 RX ADMIN — ALLOPURINOL SCH MG: 100 TABLET ORAL at 09:22

## 2018-10-13 RX ADMIN — HEPARIN SODIUM SCH UNIT: 5000 INJECTION, SOLUTION INTRAVENOUS; SUBCUTANEOUS at 13:11

## 2018-10-13 RX ADMIN — FLECAINIDE ACETATE SCH MG: 100 TABLET ORAL at 09:22

## 2018-10-13 RX ADMIN — INSULIN HUMAN SCH UNIT: 100 INJECTION, SUSPENSION SUBCUTANEOUS at 09:29

## 2018-10-13 RX ADMIN — Medication SCH MG: at 21:18

## 2018-10-13 RX ADMIN — ASPIRIN SCH MG: 81 TABLET, DELAYED RELEASE ORAL at 09:21

## 2018-10-13 RX ADMIN — INSULIN LISPRO SCH UNITS: 100 INJECTION, SOLUTION INTRAVENOUS; SUBCUTANEOUS at 18:05

## 2018-10-13 RX ADMIN — BENZOCAINE AND MENTHOL PRN EA: 15; 3.6 LOZENGE ORAL at 20:06

## 2018-10-13 RX ADMIN — PRAVASTATIN SODIUM SCH MG: 10 TABLET ORAL at 21:18

## 2018-10-13 RX ADMIN — INSULIN LISPRO SCH UNITS: 100 INJECTION, SOLUTION INTRAVENOUS; SUBCUTANEOUS at 21:17

## 2018-10-13 NOTE — HOSPPROG
Hospitalist Progress Note


Assessment/Plan: 





Subjective


Follow-up on diabetic ketoacidosis, acute kidney injury, and suspected urinary 

tract infection.  Patient states he was not having any burning with urination 

or cloudy urine prior to coming into the hospital.  Otherwise no acute events 

overnight.  We discussed his creatinine value which is improving but not quite 

at its baseline.  We discussed that we continue IV fluids for another 24 hr.





Objective


Vital signs as detailed below


Exam


General-awake alert conversant no acute distress, obese


Heart-regular rate and rhythm no murmurs


Lungs-Clear to auscultation with normal respiratory effort


Abdomen-soft nontender nondistended normal bowel sounds


-no John catheter in place


Extremities-no significant pitting edema or calf pain with palpation


Skin-no concerning skin rashes noted





Labs as detailed below





Assessment and plan





Diabetic ketoacidosis-resolved.  Bicarb has improved to 22. Glucose readings 

are generally ranging in the 200 to low 300 range.  His most recent hemoglobin 

A1c that I can find is 8.2 % from April of this year.





Urinary tract infection-initially suspected.  He did not really present with 

any symptoms suggestive of an active infection of his bladder although he has 

had recent urinary tract infections.  His urine culture shows a low colony 

count.  I think we can hold antibiotics at this time.





Acute kidney injury-suspecting secondary to hypovolemia related to 

ketoacidosis.  Creatinine value is improving from 2.9-1.7.  His baseline is 

estimated somewhere between 1.3 and 1.5.





Leukocytosis-resolved.





Hyperkalemia-uncertain etiology to rise.  Continue IV fluids and will reassess 

this evening and again tomorrow morning.





Atrial fibrillation-paroxysmal.  Patient is anticoagulated with Coumadin.  He 

is also on flecainide with history of SVT.





Obstructive sleep apnea-patient is compliant with CPAP and supplemental oxygen 

at 4 L overnight.





Chronic kidney disease stage 3-baseline as detailed above.





Insulin-dependent diabetes mellitus-continue with current insulin regimen and 

place.





DVT prophylaxis-I will stop heparin at this time.  Repeat INR value tomorrow 

morning considering concurrent Coumadin use.





Disposition-possibly home in the coming 1-2 days with continued clinical 

improvement.


Objective: 


 Vital Signs











Temp Pulse Resp BP Pulse Ox


 


 37.0 C   83   16   130/74 H  89 L


 


 10/13/18 15:15  10/13/18 15:15  10/13/18 15:15  10/13/18 15:15  10/13/18 15:15








 Laboratory Results





 10/13/18 05:37 





 10/13/18 12:40 





 











 10/12/18 10/13/18 10/14/18





 05:59 05:59 05:59


 


Intake Total 3139 550 


 


Output Total 0540 612 9926


 


Balance 1639 150 -1650








 











PT  22.4 SEC (12.0-15.0)  H  10/12/18  04:50    


 


INR  1.96  (0.83-1.16)  H  10/12/18  04:50    














ICD10 Worksheet


Patient Problems: 


 Problems











Problem Status Onset


 


DKA (diabetic ketoacidoses) Acute  


 


Renal failure (ARF), acute on chronic Acute  


 


Urinary tract infection Acute  


 


Atrial fibrillation Acute  


 


Bradycardia Acute  


 


C. difficile diarrhea Acute  05/22/15


 


Chest pain Acute  


 


Chest pain at rest Acute  


 


Dizziness Acute  


 


Hyperkalemia Acute  


 


Laceration Acute  


 


Primary osteoarthritis of left hip Acute  


 


Renal insufficiency Acute

## 2018-10-14 LAB
INR PPP: 1.57 (ref 0.83–1.16)
PROTHROMBIN TIME: 18.9 SEC (ref 12–15)

## 2018-10-14 RX ADMIN — ALLOPURINOL SCH MG: 100 TABLET ORAL at 08:53

## 2018-10-14 RX ADMIN — INSULIN HUMAN SCH UNIT: 100 INJECTION, SUSPENSION SUBCUTANEOUS at 07:51

## 2018-10-14 RX ADMIN — FLECAINIDE ACETATE SCH MG: 100 TABLET ORAL at 18:13

## 2018-10-14 RX ADMIN — WARFARIN SODIUM SCH MG: 7.5 TABLET ORAL at 17:05

## 2018-10-14 RX ADMIN — INSULIN LISPRO SCH UNITS: 100 INJECTION, SOLUTION INTRAVENOUS; SUBCUTANEOUS at 07:50

## 2018-10-14 RX ADMIN — FUROSEMIDE SCH MG: 20 TABLET ORAL at 15:16

## 2018-10-14 RX ADMIN — ASPIRIN SCH MG: 81 TABLET, DELAYED RELEASE ORAL at 08:53

## 2018-10-14 RX ADMIN — Medication SCH MG: at 17:05

## 2018-10-14 RX ADMIN — FLECAINIDE ACETATE SCH MG: 100 TABLET ORAL at 07:52

## 2018-10-14 RX ADMIN — Medication SCH MG: at 22:37

## 2018-10-14 RX ADMIN — INSULIN LISPRO SCH UNITS: 100 INJECTION, SOLUTION INTRAVENOUS; SUBCUTANEOUS at 13:15

## 2018-10-14 RX ADMIN — INSULIN HUMAN SCH UNIT: 100 INJECTION, SUSPENSION SUBCUTANEOUS at 18:13

## 2018-10-14 RX ADMIN — BENZOCAINE AND MENTHOL PRN EA: 15; 3.6 LOZENGE ORAL at 11:35

## 2018-10-14 RX ADMIN — PRAVASTATIN SODIUM SCH MG: 10 TABLET ORAL at 22:37

## 2018-10-14 RX ADMIN — Medication SCH MG: at 08:53

## 2018-10-14 NOTE — HOSPPROG
Hospitalist Progress Note


Assessment/Plan: 





Subjective


Follow-up on diabetic ketoacidosis, acute kidney injury, and suspected urinary 

tract infection.  No acute events overnight.  We reviewed that his creatinine 

value has improved to his baseline.  Again patient states he is not currently 

or did not recently have any symptoms suggestive of urinary tract infection.





Objective


Vital signs as detailed below


Exam


General-awake alert conversant no acute distress, obese


Heart-regular rate and rhythm no murmurs


Lungs-Clear to auscultation with normal respiratory effort


Abdomen-soft nontender nondistended normal bowel sounds


-no John catheter in place


Extremities-no significant pitting edema or calf pain with palpation


Skin-no concerning skin rashes noted





Labs as detailed below





Assessment and plan





Diabetic ketoacidosis-resolved.  His most recent hemoglobin A1c that I can find 

is 8.2 % from April of this year.





Urinary tract infection-initially suspected.  He did not really present with 

any symptoms suggestive of an active infection of his bladder although he has 

had recent urinary tract infections.  His urine culture shows a low colony 

count.  I think we can hold antibiotics at this time.





Acute kidney injury-suspecting secondary to hypovolemia related to 

ketoacidosis.  Improved with a creatinine of 1.4 today.  His baseline is 

estimated somewhere between 1.3 and 1.5.





Leukocytosis-resolved.





Hyperkalemia-improving.  I think he is mostly volume repleted at this time so I 

will resume his home Lasix at 20 mg daily.  He was previously on 20 mg twice a 

day.  I anticipate this will likely be normal tomorrow.  Continue IV fluids for 

another 24 hr.





Atrial fibrillation-paroxysmal.  Patient is anticoagulated with Coumadin.  He 

is also on flecainide with history of SVT.





Obstructive sleep apnea-patient is compliant with CPAP and supplemental oxygen 

at 4 L overnight.





Chronic kidney disease stage 3-baseline as detailed above.





Insulin-dependent diabetes mellitus-he has been using 70/30 insulin at home as 

it is the most affordable for him.  I recommend that we increase the dose to 65 

units twice a day.  He is under the care of the endocrinologist.





DVT prophylaxis-patient is on anticoagulation.





Disposition-possibly home in the coming 1-2 days with continued clinical 

improvement.


Objective: 


 Vital Signs











Temp Pulse Resp BP Pulse Ox


 


 36.6 C   86   16   140/77 H  90 L


 


 10/14/18 15:38  10/14/18 15:38  10/14/18 15:38  10/14/18 15:38  10/14/18 15:38








 Laboratory Results





 10/13/18 05:37 





 10/14/18 05:16 





 











 10/13/18 10/14/18 10/15/18





 05:59 05:59 05:59


 


Intake Total 550 1800 1025


 


Output Total 400 3950 1550


 


Balance 150 -2150 -525








 











PT  18.9 SEC (12.0-15.0)  H  10/14/18  05:16    


 


INR  1.57  (0.83-1.16)  H  10/14/18  05:16    














ICD10 Worksheet


Patient Problems: 


 Problems











Problem Status Onset


 


DKA (diabetic ketoacidoses) Acute  


 


Renal failure (ARF), acute on chronic Acute  


 


Urinary tract infection Acute  


 


Atrial fibrillation Acute  


 


Bradycardia Acute  


 


C. difficile diarrhea Acute  05/22/15


 


Chest pain Acute  


 


Chest pain at rest Acute  


 


Dizziness Acute  


 


Hyperkalemia Acute  


 


Laceration Acute  


 


Primary osteoarthritis of left hip Acute  


 


Renal insufficiency Acute

## 2018-10-15 LAB
INR PPP: 1.6 (ref 0.83–1.16)
PROTHROMBIN TIME: 19.2 SEC (ref 12–15)

## 2018-10-15 RX ADMIN — ALLOPURINOL SCH MG: 100 TABLET ORAL at 09:33

## 2018-10-15 RX ADMIN — Medication SCH MG: at 16:34

## 2018-10-15 RX ADMIN — FUROSEMIDE SCH MG: 20 TABLET ORAL at 09:33

## 2018-10-15 RX ADMIN — PRAVASTATIN SODIUM SCH MG: 10 TABLET ORAL at 20:32

## 2018-10-15 RX ADMIN — INSULIN HUMAN SCH UNIT: 100 INJECTION, SUSPENSION SUBCUTANEOUS at 18:08

## 2018-10-15 RX ADMIN — INSULIN LISPRO SCH UNITS: 100 INJECTION, SOLUTION INTRAVENOUS; SUBCUTANEOUS at 18:08

## 2018-10-15 RX ADMIN — Medication SCH MG: at 09:33

## 2018-10-15 RX ADMIN — FLECAINIDE ACETATE SCH MG: 100 TABLET ORAL at 09:33

## 2018-10-15 RX ADMIN — ASPIRIN SCH MG: 81 TABLET, DELAYED RELEASE ORAL at 09:33

## 2018-10-15 RX ADMIN — INSULIN HUMAN SCH UNIT: 100 INJECTION, SUSPENSION SUBCUTANEOUS at 08:34

## 2018-10-15 RX ADMIN — WARFARIN SODIUM SCH MG: 7.5 TABLET ORAL at 16:34

## 2018-10-15 RX ADMIN — Medication SCH MG: at 20:32

## 2018-10-15 RX ADMIN — FLECAINIDE ACETATE SCH MG: 100 TABLET ORAL at 18:07

## 2018-10-15 NOTE — HOSPPROG
Hospitalist Progress Note


Assessment/Plan: 





74 yo M w MELIA, uncontrolled DM and met acidosis





?DKA: not dka - normal ketones on admit


   it was a decompensated diabetic state





MELIA: prerenal 2/2 hyperglycemia plus lasix


   resolved


   jhold lasx





dm: uncontrolled


   check a1c


   increase 70/30 to 72 bid


   has outpt endocrinologist 





proph: anticoagulated





dispo: inpt


Subjective: blood sugars remain elevated


Objective: 


 Vital Signs











Temp Pulse Resp BP Pulse Ox


 


 36.6 C   61   16   122/63 H  87 L


 


 10/15/18 13:06  10/15/18 13:06  10/15/18 13:06  10/15/18 13:06  10/15/18 13:06








 Microbiology











 10/11/18 23:45 Urine Culture - Final





 Urine,Clean Catch    Klebsiella Oxytoca





    Gram Neg [place] Lactose #2





    Two [place] Types








 Laboratory Results





 10/13/18 05:37 





 10/15/18 05:08 





 











 10/14/18 10/15/18 10/16/18





 05:59 05:59 05:59


 


Intake Total 1800 1525 


 


Output Total 3950 2550 475


 


Balance -2150 -1025 -475








 











PT  19.2 SEC (12.0-15.0)  H  10/15/18  05:08    


 


INR  1.60  (0.83-1.16)  H  10/15/18  05:08    














- Physical Exam


Constitutional: no apparent distress, appears nourished


Eyes: PERRL, anicteric sclera


Ears, Nose, Mouth, Throat: moist mucous membranes, hearing normal


Cardiovascular: regular rate and rhythym, no murmur, rub, or gallop


Respiratory: no respiratory distress, no rales or rhonchi


Gastrointestinal: normoactive bowel sounds, soft, non-tender abdomen


Genitourinary: No werner in urethra


Skin: warm, normal color


Musculoskeletal: full muscle strength


Neurologic: AAOx3





ICD10 Worksheet


Patient Problems: 


 Problems











Problem Status Onset


 


DKA (diabetic ketoacidoses) Acute  


 


Renal failure (ARF), acute on chronic Acute  


 


Urinary tract infection Acute  


 


Atrial fibrillation Acute  


 


Bradycardia Acute  


 


C. difficile diarrhea Acute  05/22/15


 


Chest pain Acute  


 


Chest pain at rest Acute  


 


Dizziness Acute  


 


Hyperkalemia Acute  


 


Laceration Acute  


 


Primary osteoarthritis of left hip Acute  


 


Renal insufficiency Acute

## 2018-10-16 VITALS — DIASTOLIC BLOOD PRESSURE: 74 MMHG | SYSTOLIC BLOOD PRESSURE: 129 MMHG

## 2018-10-16 LAB
INR PPP: 1.78 (ref 0.83–1.16)
PROTHROMBIN TIME: 20.8 SEC (ref 12–15)

## 2018-10-16 RX ADMIN — INSULIN LISPRO SCH UNITS: 100 INJECTION, SOLUTION INTRAVENOUS; SUBCUTANEOUS at 08:00

## 2018-10-16 RX ADMIN — INSULIN LISPRO SCH UNITS: 100 INJECTION, SOLUTION INTRAVENOUS; SUBCUTANEOUS at 13:19

## 2018-10-16 RX ADMIN — ASPIRIN SCH MG: 81 TABLET, DELAYED RELEASE ORAL at 07:59

## 2018-10-16 RX ADMIN — INSULIN HUMAN SCH UNIT: 100 INJECTION, SUSPENSION SUBCUTANEOUS at 08:00

## 2018-10-16 RX ADMIN — FLECAINIDE ACETATE SCH MG: 100 TABLET ORAL at 07:59

## 2018-10-16 RX ADMIN — ALLOPURINOL SCH MG: 100 TABLET ORAL at 07:58

## 2018-10-16 RX ADMIN — Medication SCH MG: at 07:58

## 2018-10-16 NOTE — GDS
DISCHARGE DIAGNOSES:  

1.  Uncontrolled diabetes.

2.  Anion gap acidosis, not diabetic ketoacidosis, felt secondary to renal failure.

3.  Acute kidney injury, now resolved.

4.  Hyponatremia, now resolved.

5.  Paroxysmal atrial fibrillation, on anticoagulation.

6.  Chronic hypoxic respiratory failure.

7.  Gout.  



Please see admission history and physical by Dr. Tank Winter.  Presented with malaise.  This was on t
he 11th.  He was found to have an anion gap acidosis and BUN and creatinine of 113 and 2.9, hyponatre
jacqueline, and hyperglycemia.  He was felt to be DKA as the serum ketones were not back at that time.  They
 ultimately came back negative.  Given his type 2 physiology and daily insulin use, I think the etiol
ogy of his anion gap acidosis is more consistent with renal failure from taking Lasix and profound hy
perglycemia, as well as concurrent ACE inhibitor therapy.  He was volume resuscitated with creatinine
 normalizing to his baseline of 1.3. 



Hemoglobin A1c returned at about 13.8.  He has been losing weight lately, which he attributes to diet
sergo modifications, although I think it is more likely that he has uncontrolled diabetes.  I did incre
ase his insulin to 86 units b.i.d. of 70/30.  He has financial constraints keeping him from getting a
 more effective insulin regimen, such as Lantus with lispro.  He does see Dr. Murray, who follows him
. 



We spent a great deal of time talking about the importance of physical exercise and weight loss in ma
naging his uncontrolled diabetes.  He demonstrated understanding.  His INR is subtherapeutic, but he 
was not bridged given his paroxysmal atrial fibrillation.  Medications are unchanged, other than the 
increase of his insulin to 86 units b.i.d.  I also decreased his Lasix to once daily.





Job #:  [id number]

## 2018-10-16 NOTE — HOSPPROG
Hospitalist Progress Note


Assessment/Plan: 





74 yo M w MELIA, uncontrolled DM and met acidosis





?DKA: not dka - normal ketones on admit


   it was a decompensated diabetic state





MELIA: prerenal 2/2 hyperglycemia plus lasix


   resolved


   hold lasix





dm: uncontrolled


    a1c- 


   increase 70/30 to 72 bid


   has outpt endocrinologist 





proph: anticoagulated





dispo: home today


   > 30 minutes on dc summary


Subjective: blood sugars marginally improved on increased insulin.  a1c 13.8


Objective: 


 Vital Signs











Temp Pulse Resp BP Pulse Ox


 


 36.8 C   79   16   129/74 H  93 


 


 10/16/18 13:07  10/16/18 13:07  10/16/18 13:07  10/16/18 13:07  10/16/18 13:07








 Microbiology











 10/11/18 23:45 Urine Culture - Final





 Urine,Clean Catch    Klebsiella Oxytoca





    Gram Neg [place] Lactose #2





    Two [place] Types








 Laboratory Results





 10/13/18 05:37 





 10/16/18 05:00 





 











 10/15/18 10/16/18 10/17/18





 05:59 05:59 05:59


 


Intake Total 1525 2000 


 


Output Total 2550 3025 100


 


Balance -1025 -1025 -100








 











PT  20.8 SEC (12.0-15.0)  H  10/16/18  05:00    


 


INR  1.78  (0.83-1.16)  H  10/16/18  05:00    














- Physical Exam


Constitutional: no apparent distress, appears nourished


Eyes: PERRL, anicteric sclera


Ears, Nose, Mouth, Throat: moist mucous membranes, hearing normal


Cardiovascular: regular rate and rhythym, no murmur, rub, or gallop


Respiratory: no respiratory distress, no rales or rhonchi


Gastrointestinal: normoactive bowel sounds, soft, non-tender abdomen


Genitourinary: no bladder fullness, No werner in urethra


Skin: warm, normal color


Musculoskeletal: full muscle strength, no muscle tenderness


Neurologic: AAOx3


Psychiatric: interacting appropriately





ICD10 Worksheet


Patient Problems: 


 Problems











Problem Status Onset


 


DKA (diabetic ketoacidoses) Acute  


 


Renal failure (ARF), acute on chronic Acute  


 


Urinary tract infection Acute  


 


Atrial fibrillation Acute  


 


Bradycardia Acute  


 


C. difficile diarrhea Acute  05/22/15


 


Chest pain Acute  


 


Chest pain at rest Acute  


 


Dizziness Acute  


 


Hyperkalemia Acute  


 


Laceration Acute  


 


Primary osteoarthritis of left hip Acute  


 


Renal insufficiency Acute